# Patient Record
Sex: FEMALE | Race: WHITE | Employment: FULL TIME | URBAN - METROPOLITAN AREA
[De-identification: names, ages, dates, MRNs, and addresses within clinical notes are randomized per-mention and may not be internally consistent; named-entity substitution may affect disease eponyms.]

---

## 2017-01-19 RX ORDER — ROSUVASTATIN CALCIUM 5 MG/1
TABLET, COATED ORAL
Qty: 30 TABLET | Refills: 0 | Status: SHIPPED | OUTPATIENT
Start: 2017-01-19 | End: 2017-02-18 | Stop reason: SDUPTHER

## 2017-01-23 ENCOUNTER — OFFICE VISIT (OUTPATIENT)
Dept: FAMILY MEDICINE CLINIC | Age: 57
End: 2017-01-23

## 2017-01-23 VITALS
BODY MASS INDEX: 25.47 KG/M2 | WEIGHT: 157.8 LBS | SYSTOLIC BLOOD PRESSURE: 116 MMHG | DIASTOLIC BLOOD PRESSURE: 82 MMHG | TEMPERATURE: 97.6 F

## 2017-01-23 DIAGNOSIS — E78.2 MIXED HYPERLIPIDEMIA: ICD-10-CM

## 2017-01-23 DIAGNOSIS — F32.0 MILD SINGLE CURRENT EPISODE OF MAJOR DEPRESSIVE DISORDER (HCC): ICD-10-CM

## 2017-01-23 DIAGNOSIS — M79.10 MYALGIA: Primary | ICD-10-CM

## 2017-01-23 DIAGNOSIS — E03.9 ACQUIRED HYPOTHYROIDISM: ICD-10-CM

## 2017-01-23 PROCEDURE — 99214 OFFICE O/P EST MOD 30 MIN: CPT | Performed by: FAMILY MEDICINE

## 2017-01-23 ASSESSMENT — ENCOUNTER SYMPTOMS
EYES NEGATIVE: 1
GASTROINTESTINAL NEGATIVE: 1
BACK PAIN: 0
RESPIRATORY NEGATIVE: 1

## 2017-01-24 DIAGNOSIS — E78.2 MIXED HYPERLIPIDEMIA: ICD-10-CM

## 2017-01-24 DIAGNOSIS — E03.9 ACQUIRED HYPOTHYROIDISM: ICD-10-CM

## 2017-01-24 LAB
A/G RATIO: 2.1 (ref 1.1–2.2)
ALBUMIN SERPL-MCNC: 4.2 G/DL (ref 3.4–5)
ALP BLD-CCNC: 58 U/L (ref 40–129)
ALT SERPL-CCNC: 34 U/L (ref 10–40)
ANION GAP SERPL CALCULATED.3IONS-SCNC: 14 MMOL/L (ref 3–16)
AST SERPL-CCNC: 29 U/L (ref 15–37)
BILIRUB SERPL-MCNC: 0.4 MG/DL (ref 0–1)
BUN BLDV-MCNC: 15 MG/DL (ref 7–20)
CALCIUM SERPL-MCNC: 8.6 MG/DL (ref 8.3–10.6)
CHLORIDE BLD-SCNC: 103 MMOL/L (ref 99–110)
CHOLESTEROL, TOTAL: 163 MG/DL (ref 0–199)
CO2: 24 MMOL/L (ref 21–32)
CREAT SERPL-MCNC: 0.8 MG/DL (ref 0.6–1.1)
GFR AFRICAN AMERICAN: >60
GFR NON-AFRICAN AMERICAN: >60
GLOBULIN: 2 G/DL
GLUCOSE BLD-MCNC: 72 MG/DL (ref 70–99)
HDLC SERPL-MCNC: 79 MG/DL (ref 40–60)
LDL CHOLESTEROL CALCULATED: 72 MG/DL
POTASSIUM SERPL-SCNC: 4.6 MMOL/L (ref 3.5–5.1)
SODIUM BLD-SCNC: 141 MMOL/L (ref 136–145)
T4 FREE: 1.5 NG/DL (ref 0.9–1.8)
TOTAL PROTEIN: 6.2 G/DL (ref 6.4–8.2)
TRIGL SERPL-MCNC: 59 MG/DL (ref 0–150)
TSH SERPL DL<=0.05 MIU/L-ACNC: 5.65 UIU/ML (ref 0.27–4.2)
VLDLC SERPL CALC-MCNC: 12 MG/DL

## 2017-01-25 ENCOUNTER — PATIENT MESSAGE (OUTPATIENT)
Dept: FAMILY MEDICINE CLINIC | Age: 57
End: 2017-01-25

## 2017-01-25 DIAGNOSIS — E03.9 ACQUIRED HYPOTHYROIDISM: Primary | ICD-10-CM

## 2017-01-25 DIAGNOSIS — I10 ESSENTIAL HYPERTENSION: ICD-10-CM

## 2017-02-18 DIAGNOSIS — E78.2 MIXED HYPERLIPIDEMIA: Primary | ICD-10-CM

## 2017-02-18 RX ORDER — ROSUVASTATIN CALCIUM 5 MG/1
5 TABLET, COATED ORAL DAILY
Qty: 90 TABLET | Refills: 1 | Status: SHIPPED | OUTPATIENT
Start: 2017-02-18 | End: 2017-08-17 | Stop reason: SDUPTHER

## 2017-03-29 DIAGNOSIS — E03.9 ACQUIRED HYPOTHYROIDISM: ICD-10-CM

## 2017-03-29 RX ORDER — LEVOTHYROXINE SODIUM 0.15 MG/1
150 TABLET ORAL DAILY
Qty: 90 TABLET | Refills: 1 | Status: SHIPPED | OUTPATIENT
Start: 2017-03-29 | End: 2017-09-21 | Stop reason: SDUPTHER

## 2017-07-03 ENCOUNTER — HOSPITAL ENCOUNTER (OUTPATIENT)
Dept: MAMMOGRAPHY | Age: 57
Discharge: HOME | End: 2017-07-03
Attending: OBSTETRICS & GYNECOLOGY | Admitting: OBSTETRICS & GYNECOLOGY

## 2017-07-03 DIAGNOSIS — Z12.31 VISIT FOR SCREENING MAMMOGRAM: ICD-10-CM

## 2017-08-17 DIAGNOSIS — E78.2 MIXED HYPERLIPIDEMIA: ICD-10-CM

## 2017-08-17 RX ORDER — ROSUVASTATIN CALCIUM 5 MG/1
TABLET, COATED ORAL
Qty: 30 TABLET | Refills: 0 | Status: SHIPPED | OUTPATIENT
Start: 2017-08-17 | End: 2017-09-06 | Stop reason: SDUPTHER

## 2017-09-06 DIAGNOSIS — E78.2 MIXED HYPERLIPIDEMIA: ICD-10-CM

## 2017-09-06 RX ORDER — ROSUVASTATIN CALCIUM 5 MG/1
5 TABLET, COATED ORAL DAILY
Qty: 21 TABLET | Refills: 0 | Status: SHIPPED | OUTPATIENT
Start: 2017-09-06 | End: 2017-10-19 | Stop reason: SDUPTHER

## 2017-09-21 DIAGNOSIS — E03.9 ACQUIRED HYPOTHYROIDISM: ICD-10-CM

## 2017-09-21 RX ORDER — LEVOTHYROXINE SODIUM 150 MCG
TABLET ORAL
Qty: 30 TABLET | Refills: 0 | Status: SHIPPED | OUTPATIENT
Start: 2017-09-21 | End: 2017-10-18 | Stop reason: SDUPTHER

## 2017-10-18 DIAGNOSIS — E03.9 ACQUIRED HYPOTHYROIDISM: ICD-10-CM

## 2017-10-18 RX ORDER — LEVOTHYROXINE SODIUM 150 MCG
TABLET ORAL
Qty: 21 TABLET | Refills: 0 | Status: SHIPPED | OUTPATIENT
Start: 2017-10-18 | End: 2017-10-30 | Stop reason: SDUPTHER

## 2017-10-19 DIAGNOSIS — E78.2 MIXED HYPERLIPIDEMIA: ICD-10-CM

## 2017-10-19 RX ORDER — ROSUVASTATIN CALCIUM 5 MG/1
5 TABLET, COATED ORAL DAILY
Qty: 14 TABLET | Refills: 0 | Status: SHIPPED | OUTPATIENT
Start: 2017-10-19 | End: 2017-10-30 | Stop reason: SDUPTHER

## 2017-10-30 ENCOUNTER — OFFICE VISIT (OUTPATIENT)
Dept: FAMILY MEDICINE CLINIC | Age: 57
End: 2017-10-30

## 2017-10-30 VITALS
TEMPERATURE: 97.8 F | DIASTOLIC BLOOD PRESSURE: 78 MMHG | SYSTOLIC BLOOD PRESSURE: 126 MMHG | WEIGHT: 152.2 LBS | BODY MASS INDEX: 28.01 KG/M2 | HEIGHT: 62 IN

## 2017-10-30 DIAGNOSIS — I10 ESSENTIAL HYPERTENSION: ICD-10-CM

## 2017-10-30 DIAGNOSIS — R74.8 ELEVATED LIVER ENZYMES: ICD-10-CM

## 2017-10-30 DIAGNOSIS — E03.9 ACQUIRED HYPOTHYROIDISM: ICD-10-CM

## 2017-10-30 DIAGNOSIS — F43.9 STRESS AT HOME: ICD-10-CM

## 2017-10-30 DIAGNOSIS — E78.2 MIXED HYPERLIPIDEMIA: Primary | ICD-10-CM

## 2017-10-30 PROCEDURE — 99214 OFFICE O/P EST MOD 30 MIN: CPT | Performed by: FAMILY MEDICINE

## 2017-10-30 RX ORDER — ROSUVASTATIN CALCIUM 5 MG/1
5 TABLET, COATED ORAL DAILY
Qty: 90 TABLET | Refills: 1 | Status: SHIPPED | OUTPATIENT
Start: 2017-10-30 | End: 2018-04-22 | Stop reason: SDUPTHER

## 2017-10-30 RX ORDER — LEVOTHYROXINE SODIUM 0.15 MG/1
150 TABLET ORAL DAILY
Qty: 90 TABLET | Refills: 1 | Status: SHIPPED | OUTPATIENT
Start: 2017-10-30 | End: 2018-05-21 | Stop reason: SDUPTHER

## 2017-10-30 ASSESSMENT — ENCOUNTER SYMPTOMS
GASTROINTESTINAL NEGATIVE: 1
RESPIRATORY NEGATIVE: 1
EYES NEGATIVE: 1

## 2017-10-30 NOTE — PROGRESS NOTES
tablet Take 1 tablet by mouth nightly. 90 tablet 1    loratadine (CLARITIN) 10 MG tablet Take 10 mg by mouth daily.  buPROPion (WELLBUTRIN XL) 300 MG XL tablet Take 300 mg by mouth every morning.  fluoxetine (PROZAC) 40 MG capsule Take 2 capsules by mouth daily. 60 capsule 3    aspirin 81 MG EC tablet Take 81 mg by mouth daily.  Multiple Vitamin (MULTIVITAMINS PO) Take  by mouth daily.  calcium carbonate 600 MG TABS tablet Take 1 tablet by mouth 2 times daily. Past Medical History:   Diagnosis Date    Depression     Essential hypertension 2017    Hyperlipidemia     Hyperthyroidism      Past Surgical History:   Procedure Laterality Date     SECTION      x4    COLONOSCOPY      2010    HYSTERECTOMY      SERGIO AND BSO       Family History   Problem Relation Age of Onset    Diabetes Mother     High Cholesterol Mother     High Blood Pressure Mother     Arthritis Mother     Depression Mother     Heart Disease Mother     High Blood Pressure Father     Coronary Art Dis Father     Arthritis Father     Heart Disease Father     Coronary Art Dis Sister     Other Sister      mi x2    Heart Disease Sister     High Cholesterol Sister     Diabetes Maternal Grandmother     Coronary Art Dis Maternal Grandmother     Heart Disease Maternal Grandmother     Coronary Art Dis Paternal Grandfather     Heart Disease Paternal Grandfather      Social History     Social History    Marital status:      Spouse name: N/A    Number of children: N/A    Years of education: N/A     Occupational History    Not on file.      Social History Main Topics    Smoking status: Never Smoker    Smokeless tobacco: Never Used    Alcohol use Yes    Drug use: Unknown    Sexual activity: Yes     Other Topics Concern    Not on file     Social History Narrative    No narrative on file         Any recent diagnostic tests, hospital reports, office notes or consultation letters were

## 2017-11-15 ENCOUNTER — TELEPHONE (OUTPATIENT)
Dept: OTHER | Facility: CLINIC | Age: 57
End: 2017-11-15

## 2018-02-09 ENCOUNTER — TELEPHONE (OUTPATIENT)
Dept: FAMILY MEDICINE CLINIC | Age: 58
End: 2018-02-09

## 2018-02-09 NOTE — TELEPHONE ENCOUNTER
Pt called requesting to have her son, Nilda Larry, age 25, become a new patient  Although telling her multiple times we werent open to new pts, she was persistant  She said she and her  both come here and wouldn't take no for an answer  She said he is covered under her insurance  Patient' son has ADD and Depression and is on Prozac and Vyvanse and sees Dr Nola Keene for those medications  Insisted he be seen  Wants a call back soon so she can make an appt for him  Please call mom with a yes or no

## 2018-04-22 DIAGNOSIS — E78.2 MIXED HYPERLIPIDEMIA: ICD-10-CM

## 2018-04-22 RX ORDER — ROSUVASTATIN CALCIUM 5 MG/1
5 TABLET, COATED ORAL DAILY
Qty: 90 TABLET | Refills: 0 | Status: SHIPPED | OUTPATIENT
Start: 2018-04-22 | End: 2018-05-21 | Stop reason: SDUPTHER

## 2018-05-21 ENCOUNTER — OFFICE VISIT (OUTPATIENT)
Dept: FAMILY MEDICINE CLINIC | Age: 58
End: 2018-05-21

## 2018-05-21 VITALS
SYSTOLIC BLOOD PRESSURE: 112 MMHG | WEIGHT: 142 LBS | OXYGEN SATURATION: 98 % | BODY MASS INDEX: 26.13 KG/M2 | DIASTOLIC BLOOD PRESSURE: 84 MMHG | HEART RATE: 65 BPM | HEIGHT: 62 IN

## 2018-05-21 DIAGNOSIS — I10 ESSENTIAL HYPERTENSION: ICD-10-CM

## 2018-05-21 DIAGNOSIS — E03.9 ACQUIRED HYPOTHYROIDISM: ICD-10-CM

## 2018-05-21 DIAGNOSIS — E78.2 MIXED HYPERLIPIDEMIA: ICD-10-CM

## 2018-05-21 PROCEDURE — 99214 OFFICE O/P EST MOD 30 MIN: CPT | Performed by: FAMILY MEDICINE

## 2018-05-21 RX ORDER — LEVOTHYROXINE SODIUM 0.15 MG/1
150 TABLET ORAL DAILY
Qty: 90 TABLET | Refills: 1 | Status: SHIPPED | OUTPATIENT
Start: 2018-05-21 | End: 2019-01-28 | Stop reason: SDUPTHER

## 2018-05-21 RX ORDER — ROSUVASTATIN CALCIUM 5 MG/1
5 TABLET, COATED ORAL DAILY
Qty: 90 TABLET | Refills: 1 | Status: SHIPPED | OUTPATIENT
Start: 2018-05-21 | End: 2018-11-06 | Stop reason: SDUPTHER

## 2018-05-21 ASSESSMENT — ENCOUNTER SYMPTOMS
EYES NEGATIVE: 1
GASTROINTESTINAL NEGATIVE: 1
RESPIRATORY NEGATIVE: 1

## 2018-07-11 ENCOUNTER — HOSPITAL ENCOUNTER (OUTPATIENT)
Dept: MAMMOGRAPHY | Age: 58
Discharge: HOME | End: 2018-07-11
Attending: OBSTETRICS & GYNECOLOGY | Admitting: OBSTETRICS & GYNECOLOGY

## 2018-07-11 DIAGNOSIS — Z12.31 VISIT FOR SCREENING MAMMOGRAM: ICD-10-CM

## 2018-11-06 DIAGNOSIS — E78.2 MIXED HYPERLIPIDEMIA: ICD-10-CM

## 2018-11-06 RX ORDER — ROSUVASTATIN CALCIUM 5 MG/1
5 TABLET, COATED ORAL DAILY
Qty: 90 TABLET | Refills: 1 | Status: SHIPPED | OUTPATIENT
Start: 2018-11-06 | End: 2019-02-06 | Stop reason: SDUPTHER

## 2018-11-06 NOTE — TELEPHONE ENCOUNTER
Last visit date- 5/21/2018          Future appointment scheduled-02/11/2019       Last refill date-05/21/2018 90 tabs with 1 refill      Last labs-05/18/2018

## 2019-01-28 ENCOUNTER — TELEPHONE (OUTPATIENT)
Dept: FAMILY MEDICINE CLINIC | Age: 59
End: 2019-01-28

## 2019-01-28 DIAGNOSIS — E03.9 ACQUIRED HYPOTHYROIDISM: ICD-10-CM

## 2019-01-28 RX ORDER — LEVOTHYROXINE SODIUM 0.15 MG/1
150 TABLET ORAL DAILY
Qty: 90 TABLET | Refills: 1 | Status: SHIPPED | OUTPATIENT
Start: 2019-01-28 | End: 2019-01-29 | Stop reason: SDUPTHER

## 2019-01-29 DIAGNOSIS — E03.9 ACQUIRED HYPOTHYROIDISM: ICD-10-CM

## 2019-01-29 RX ORDER — LEVOTHYROXINE SODIUM 0.15 MG/1
150 TABLET ORAL DAILY
Qty: 90 TABLET | Refills: 1 | Status: SHIPPED | OUTPATIENT
Start: 2019-01-29 | End: 2019-02-06 | Stop reason: SDUPTHER

## 2019-02-06 ENCOUNTER — OFFICE VISIT (OUTPATIENT)
Dept: FAMILY MEDICINE CLINIC | Age: 59
End: 2019-02-06
Payer: COMMERCIAL

## 2019-02-06 VITALS
DIASTOLIC BLOOD PRESSURE: 76 MMHG | SYSTOLIC BLOOD PRESSURE: 120 MMHG | OXYGEN SATURATION: 97 % | HEART RATE: 63 BPM | WEIGHT: 151 LBS | BODY MASS INDEX: 27.79 KG/M2 | HEIGHT: 62 IN

## 2019-02-06 DIAGNOSIS — E03.9 ACQUIRED HYPOTHYROIDISM: ICD-10-CM

## 2019-02-06 DIAGNOSIS — Z12.11 ENCOUNTER FOR SCREENING COLONOSCOPY: Primary | ICD-10-CM

## 2019-02-06 DIAGNOSIS — E78.2 MIXED HYPERLIPIDEMIA: ICD-10-CM

## 2019-02-06 DIAGNOSIS — Z00.00 ANNUAL PHYSICAL EXAM: ICD-10-CM

## 2019-02-06 DIAGNOSIS — Z00.00 ANNUAL PHYSICAL EXAM: Primary | ICD-10-CM

## 2019-02-06 LAB
A/G RATIO: 1.6 (ref 1.1–2.2)
ALBUMIN SERPL-MCNC: 4.2 G/DL (ref 3.4–5)
ALP BLD-CCNC: 50 U/L (ref 40–129)
ALT SERPL-CCNC: 40 U/L (ref 10–40)
ANION GAP SERPL CALCULATED.3IONS-SCNC: 14 MMOL/L (ref 3–16)
AST SERPL-CCNC: 31 U/L (ref 15–37)
BILIRUB SERPL-MCNC: 0.4 MG/DL (ref 0–1)
BUN BLDV-MCNC: 19 MG/DL (ref 7–20)
CALCIUM SERPL-MCNC: 9.3 MG/DL (ref 8.3–10.6)
CHLORIDE BLD-SCNC: 106 MMOL/L (ref 99–110)
CHOLESTEROL, TOTAL: 199 MG/DL (ref 0–199)
CO2: 23 MMOL/L (ref 21–32)
CREAT SERPL-MCNC: 0.8 MG/DL (ref 0.6–1.2)
GFR AFRICAN AMERICAN: >60
GFR NON-AFRICAN AMERICAN: >60
GLOBULIN: 2.7 G/DL
GLUCOSE BLD-MCNC: 75 MG/DL (ref 70–99)
HCT VFR BLD CALC: 43.1 % (ref 36–48)
HDLC SERPL-MCNC: 86 MG/DL (ref 40–60)
HEMOGLOBIN: 14.6 G/DL (ref 12–16)
LDL CHOLESTEROL CALCULATED: 101 MG/DL
MCH RBC QN AUTO: 32.1 PG (ref 26–34)
MCHC RBC AUTO-ENTMCNC: 33.8 G/DL (ref 31–36)
MCV RBC AUTO: 95 FL (ref 80–100)
PDW BLD-RTO: 12.1 % (ref 12.4–15.4)
PLATELET # BLD: 259 K/UL (ref 135–450)
PMV BLD AUTO: 9 FL (ref 5–10.5)
POTASSIUM SERPL-SCNC: 4.8 MMOL/L (ref 3.5–5.1)
RBC # BLD: 4.54 M/UL (ref 4–5.2)
SODIUM BLD-SCNC: 143 MMOL/L (ref 136–145)
T4 FREE: 0.9 NG/DL (ref 0.9–1.8)
TOTAL PROTEIN: 6.9 G/DL (ref 6.4–8.2)
TRIGL SERPL-MCNC: 62 MG/DL (ref 0–150)
TSH SERPL DL<=0.05 MIU/L-ACNC: 9.61 UIU/ML (ref 0.27–4.2)
VITAMIN D 25-HYDROXY: 42 NG/ML
VLDLC SERPL CALC-MCNC: 12 MG/DL
WBC # BLD: 5.3 K/UL (ref 4–11)

## 2019-02-06 PROCEDURE — 99396 PREV VISIT EST AGE 40-64: CPT | Performed by: FAMILY MEDICINE

## 2019-02-06 RX ORDER — LEVOTHYROXINE SODIUM 0.15 MG/1
150 TABLET ORAL DAILY
Qty: 90 TABLET | Refills: 3 | Status: SHIPPED | OUTPATIENT
Start: 2019-02-06 | End: 2020-03-10

## 2019-02-06 RX ORDER — ROSUVASTATIN CALCIUM 5 MG/1
5 TABLET, COATED ORAL DAILY
Qty: 90 TABLET | Refills: 3 | Status: SHIPPED | OUTPATIENT
Start: 2019-02-06 | End: 2020-03-31

## 2019-02-06 ASSESSMENT — ENCOUNTER SYMPTOMS
EYE REDNESS: 0
SHORTNESS OF BREATH: 0
NAUSEA: 0
ABDOMINAL PAIN: 0
COLOR CHANGE: 0
EYE DISCHARGE: 0

## 2019-02-07 ENCOUNTER — TELEPHONE (OUTPATIENT)
Dept: FAMILY MEDICINE CLINIC | Age: 59
End: 2019-02-07

## 2019-02-07 DIAGNOSIS — E03.9 ACQUIRED HYPOTHYROIDISM: Primary | ICD-10-CM

## 2019-07-12 ENCOUNTER — TELEPHONE (OUTPATIENT)
Dept: FAMILY MEDICINE CLINIC | Age: 59
End: 2019-07-12

## 2019-07-12 ENCOUNTER — HOSPITAL ENCOUNTER (OUTPATIENT)
Dept: MAMMOGRAPHY | Age: 59
Discharge: HOME | End: 2019-07-12
Payer: COMMERCIAL

## 2019-07-12 ENCOUNTER — HOSPITAL ENCOUNTER (OUTPATIENT)
Dept: ULTRASOUND IMAGING | Age: 59
Discharge: HOME | End: 2019-07-12
Payer: COMMERCIAL

## 2019-07-12 ENCOUNTER — HOSPITAL ENCOUNTER (OUTPATIENT)
Dept: GENERAL RADIOLOGY | Age: 59
Discharge: HOME | End: 2019-07-12
Payer: COMMERCIAL

## 2019-07-12 DIAGNOSIS — N63.0 BREAST NODULE: ICD-10-CM

## 2019-07-12 DIAGNOSIS — N95.1 MENOPAUSAL SYNDROME: ICD-10-CM

## 2019-07-12 DIAGNOSIS — N63.0 BREAST NODULE: Primary | ICD-10-CM

## 2019-07-12 DIAGNOSIS — Z12.31 VISIT FOR SCREENING MAMMOGRAM: ICD-10-CM

## 2019-07-12 PROCEDURE — 76642 ULTRASOUND BREAST LIMITED: CPT

## 2019-07-12 PROCEDURE — 77067 SCR MAMMO BI INCL CAD: CPT

## 2019-07-12 PROCEDURE — 77080 DXA BONE DENSITY AXIAL: CPT

## 2019-07-12 PROCEDURE — 77065 DX MAMMO INCL CAD UNI: CPT

## 2019-08-02 ASSESSMENT — ENCOUNTER SYMPTOMS
CONSTIPATION: 0
NAUSEA: 0
EYE REDNESS: 0
VOMITING: 0
DIARRHEA: 0
SHORTNESS OF BREATH: 0

## 2019-08-02 NOTE — PROGRESS NOTES
Occupational History    Not on file   Social Needs    Financial resource strain: Not on file    Food insecurity:     Worry: Not on file     Inability: Not on file    Transportation needs:     Medical: Not on file     Non-medical: Not on file   Tobacco Use    Smoking status: Never Smoker    Smokeless tobacco: Never Used   Substance and Sexual Activity    Alcohol use: Yes    Drug use: Not on file    Sexual activity: Yes   Lifestyle    Physical activity:     Days per week: Not on file     Minutes per session: Not on file    Stress: Not on file   Relationships    Social connections:     Talks on phone: Not on file     Gets together: Not on file     Attends L[employer] day service: Not on file     Active member of club or organization: Not on file     Attends meetings of clubs or organizations: Not on file     Relationship status: Not on file    Intimate partner violence:     Fear of current or ex partner: Not on file     Emotionally abused: Not on file     Physically abused: Not on file     Forced sexual activity: Not on file   Other Topics Concern    Not on file   Social History Narrative    Not on file           Review of Systems   Constitutional: Negative for unexpected weight change. HENT: Negative. Eyes: Negative for redness. Respiratory: Negative for shortness of breath. Cardiovascular: Negative for chest pain and leg swelling. Gastrointestinal: Negative for constipation, diarrhea, nausea and vomiting. Endocrine: Negative for cold intolerance and heat intolerance. Skin: Negative for pallor. Allergic/Immunologic: Negative for immunocompromised state. Psychiatric/Behavioral: Negative for confusion. Controlled with medicaiton   All other systems reviewed and are negative.     Current Outpatient Medications   Medication Sig Dispense Refill    levothyroxine (SYNTHROID) 150 MCG tablet Take 1 tablet by mouth daily 90 tablet 3    rosuvastatin (CRESTOR) 5 MG tablet Take 1 tablet by mouth daily 90 tablet 3    vitamin D (CHOLECALCIFEROL) 1000 UNIT TABS tablet Take 1,000 Units by mouth daily      traZODone (DESYREL) 50 MG tablet Take 1 tablet by mouth nightly. 90 tablet 1    loratadine (CLARITIN) 10 MG tablet Take 10 mg by mouth daily.  buPROPion (WELLBUTRIN XL) 300 MG XL tablet Take 300 mg by mouth every morning.  fluoxetine (PROZAC) 40 MG capsule Take 2 capsules by mouth daily. 60 capsule 3    aspirin 81 MG EC tablet Take 81 mg by mouth daily. No current facility-administered medications for this visit. Vitals:    08/07/19 0739   BP: 120/76   Pulse: 74   SpO2: 97%         Physical Exam  Physical Exam   Constitutional: She is oriented to person, place, and time. She appears well-developed and well-nourished. No distress. HENT:   Head: Normocephalic and atraumatic. Eyes: Conjunctivae are normal. Right eye exhibits no discharge. Left eye exhibits no discharge. No scleral icterus. Neck: Normal range of motion. Neck supple. Cardiovascular: Normal rate, regular rhythm and normal heart sounds. Pulmonary/Chest: Effort normal and breath sounds normal. No stridor. No respiratory distress. Musculoskeletal: She exhibits no edema. Neurological: She is alert and oriented to person, place, and time. Skin: Skin is warm and dry. No rash noted. She is not diaphoretic. No erythema. No pallor. Psychiatric: She has a normal mood and affect. Her behavior is normal. Judgment and thought content normal.   Nursing note and vitals reviewed. Diagnosis Orders   1. Acquired hypothyroidism  T4, Free    TSH without Reflex   2. Depression  FLUoxetine (PROZAC) 40 MG capsule         Acquired hypothyroidism with elevated tsh and normal t4.  -     T4, Free  -     TSH without Reflex    Depression  -     FLUoxetine (PROZAC) 40 MG capsule; Take 2 capsules by mouth daily    Other orders  -     traZODone (DESYREL) 50 MG tablet;  Take 1.5 tablets by mouth nightly    Filled out physical

## 2019-08-07 ENCOUNTER — OFFICE VISIT (OUTPATIENT)
Dept: FAMILY MEDICINE CLINIC | Age: 59
End: 2019-08-07
Payer: COMMERCIAL

## 2019-08-07 VITALS
SYSTOLIC BLOOD PRESSURE: 120 MMHG | BODY MASS INDEX: 27.97 KG/M2 | WEIGHT: 152 LBS | HEART RATE: 74 BPM | OXYGEN SATURATION: 97 % | DIASTOLIC BLOOD PRESSURE: 76 MMHG | HEIGHT: 62 IN

## 2019-08-07 DIAGNOSIS — E03.9 ACQUIRED HYPOTHYROIDISM: ICD-10-CM

## 2019-08-07 DIAGNOSIS — E03.9 ACQUIRED HYPOTHYROIDISM: Primary | ICD-10-CM

## 2019-08-07 DIAGNOSIS — F32.A DEPRESSION, UNSPECIFIED DEPRESSION TYPE: ICD-10-CM

## 2019-08-07 LAB
T4 FREE: 1.6 NG/DL (ref 0.9–1.8)
TSH SERPL DL<=0.05 MIU/L-ACNC: 1.6 UIU/ML (ref 0.27–4.2)

## 2019-08-07 PROCEDURE — 99213 OFFICE O/P EST LOW 20 MIN: CPT | Performed by: FAMILY MEDICINE

## 2019-08-07 RX ORDER — FLUOXETINE HYDROCHLORIDE 40 MG/1
80 CAPSULE ORAL DAILY
Qty: 180 CAPSULE | Refills: 3 | Status: SHIPPED | OUTPATIENT
Start: 2019-08-07 | End: 2020-08-05

## 2019-08-07 RX ORDER — TRAZODONE HYDROCHLORIDE 50 MG/1
75 TABLET ORAL NIGHTLY
Qty: 135 TABLET | Refills: 3 | Status: SHIPPED | OUTPATIENT
Start: 2019-08-07 | End: 2020-08-28

## 2019-08-26 ENCOUNTER — OFFICE VISIT (OUTPATIENT)
Dept: FAMILY MEDICINE CLINIC | Age: 59
End: 2019-08-26
Payer: COMMERCIAL

## 2019-08-26 VITALS
WEIGHT: 155 LBS | HEART RATE: 67 BPM | TEMPERATURE: 98 F | BODY MASS INDEX: 28.52 KG/M2 | OXYGEN SATURATION: 96 % | HEIGHT: 62 IN | DIASTOLIC BLOOD PRESSURE: 74 MMHG | SYSTOLIC BLOOD PRESSURE: 110 MMHG

## 2019-08-26 DIAGNOSIS — B96.89 ACUTE BACTERIAL SINUSITIS: Primary | ICD-10-CM

## 2019-08-26 DIAGNOSIS — J01.90 ACUTE BACTERIAL SINUSITIS: Primary | ICD-10-CM

## 2019-08-26 PROCEDURE — 99213 OFFICE O/P EST LOW 20 MIN: CPT | Performed by: NURSE PRACTITIONER

## 2019-08-26 RX ORDER — AMOXICILLIN AND CLAVULANATE POTASSIUM 875; 125 MG/1; MG/1
1 TABLET, FILM COATED ORAL 2 TIMES DAILY
Qty: 20 TABLET | Refills: 0 | Status: SHIPPED | OUTPATIENT
Start: 2019-08-26 | End: 2019-09-05

## 2019-08-26 ASSESSMENT — ENCOUNTER SYMPTOMS
RHINORRHEA: 1
COUGH: 0
NAUSEA: 0
SINUS PAIN: 1
DIARRHEA: 0
SHORTNESS OF BREATH: 0
SINUS PRESSURE: 1
WHEEZING: 0
VOMITING: 0
STRIDOR: 0

## 2019-08-26 NOTE — PROGRESS NOTES
19    Chief Complaint   Patient presents with    Sinus Problem     headache, face pain, nasal discharge is yellow,      HPI: Jeffory Baumgarten is a 62 y.o. female who presents with complaints of sinus pressure, sinus pain, headache, nasal congestion. Pt states about 3 weeks ago was diagnosed with sinus infection and given RX for amoxicillin from her dentist. She was feeling better after this but symptoms never fully resolved. Symptoms worsening over the past several days. Using ibuprofen which has helped with headache. Denies fever, dyspnea, chest pain. Past Medical History:   Diagnosis Date    Depression     Essential hypertension 2017    Hyperlipidemia     Hyperthyroidism      Past Surgical History:   Procedure Laterality Date     SECTION      x4    COLONOSCOPY      2010    HYSTERECTOMY      SERGIO AND BSO       Social History     Tobacco Use    Smoking status: Never Smoker    Smokeless tobacco: Never Used   Substance Use Topics    Alcohol use: Yes    Drug use: Not on file       Family History   Problem Relation Age of Onset    Diabetes Mother     High Cholesterol Mother     High Blood Pressure Mother     Arthritis Mother     Depression Mother     Heart Disease Mother     High Blood Pressure Father     Coronary Art Dis Father     Arthritis Father     Heart Disease Father     Coronary Art Dis Sister     Other Sister         mi x2   [facility] Heart Disease Sister     High Cholesterol Sister     Diabetes Maternal Grandmother     Coronary Art Dis Maternal Grandmother     Heart Disease Maternal Grandmother     Coronary Art Dis Paternal Grandfather     Heart Disease Paternal Grandfather      Review of Systems   Constitutional: Negative for chills, diaphoresis, fatigue and fever. HENT: Positive for congestion, postnasal drip, rhinorrhea, sinus pressure and sinus pain. Respiratory: Negative for cough, shortness of breath, wheezing and stridor.     Cardiovascular: Negative for as prescribed  Follow up PRN    Patient Instructions   Augmentin twice daily for 10 days  Take daily probiotic  Follow up as needed       Outpatient Encounter Medications as of 8/26/2019   Medication Sig Dispense Refill    amoxicillin-clavulanate (AUGMENTIN) 875-125 MG per tablet Take 1 tablet by mouth 2 times daily for 10 days 20 tablet 0    FLUoxetine (PROZAC) 40 MG capsule Take 2 capsules by mouth daily 180 capsule 3    traZODone (DESYREL) 50 MG tablet Take 1.5 tablets by mouth nightly 135 tablet 3    levothyroxine (SYNTHROID) 150 MCG tablet Take 1 tablet by mouth daily 90 tablet 3    rosuvastatin (CRESTOR) 5 MG tablet Take 1 tablet by mouth daily 90 tablet 3    vitamin D (CHOLECALCIFEROL) 1000 UNIT TABS tablet Take 1,000 Units by mouth daily      loratadine (CLARITIN) 10 MG tablet Take 10 mg by mouth daily.  buPROPion (WELLBUTRIN XL) 300 MG XL tablet Take 300 mg by mouth every morning.  aspirin 81 MG EC tablet Take 81 mg by mouth daily. No facility-administered encounter medications on file as of 8/26/2019.         Vikki Langford, CNP

## 2019-11-13 ENCOUNTER — OFFICE VISIT (OUTPATIENT)
Dept: FAMILY MEDICINE CLINIC | Age: 59
End: 2019-11-13
Payer: COMMERCIAL

## 2019-11-13 VITALS
RESPIRATION RATE: 18 BRPM | WEIGHT: 155 LBS | TEMPERATURE: 98.6 F | OXYGEN SATURATION: 96 % | SYSTOLIC BLOOD PRESSURE: 126 MMHG | BODY MASS INDEX: 28.35 KG/M2 | HEART RATE: 73 BPM | DIASTOLIC BLOOD PRESSURE: 84 MMHG

## 2019-11-13 DIAGNOSIS — J02.9 SORE THROAT: Primary | ICD-10-CM

## 2019-11-13 PROCEDURE — 99213 OFFICE O/P EST LOW 20 MIN: CPT | Performed by: NURSE PRACTITIONER

## 2019-11-13 PROCEDURE — 87880 STREP A ASSAY W/OPTIC: CPT | Performed by: NURSE PRACTITIONER

## 2019-11-13 RX ORDER — AMOXICILLIN 500 MG/1
500 CAPSULE ORAL 3 TIMES DAILY
Qty: 21 CAPSULE | Refills: 0 | Status: SHIPPED | OUTPATIENT
Start: 2019-11-13 | End: 2019-11-20

## 2019-11-13 ASSESSMENT — ENCOUNTER SYMPTOMS
VOMITING: 0
COUGH: 1
CHEST TIGHTNESS: 0
RHINORRHEA: 0
SINUS PRESSURE: 0
VISUAL CHANGE: 0
NAUSEA: 0
SHORTNESS OF BREATH: 0
EYES NEGATIVE: 1
TROUBLE SWALLOWING: 1
SORE THROAT: 1
CHANGE IN BOWEL HABIT: 0
SWOLLEN GLANDS: 1
VOICE CHANGE: 1
ABDOMINAL PAIN: 0
WHEEZING: 0

## 2019-11-14 LAB — S PYO AG THROAT QL: NORMAL

## 2019-11-15 RX ORDER — METHYLPREDNISOLONE 4 MG/1
TABLET ORAL
Qty: 1 KIT | Refills: 0 | Status: SHIPPED | OUTPATIENT
Start: 2019-11-15 | End: 2019-12-11 | Stop reason: CLARIF

## 2019-12-02 ENCOUNTER — TELEPHONE (OUTPATIENT)
Dept: FAMILY MEDICINE CLINIC | Age: 59
End: 2019-12-02

## 2019-12-11 ENCOUNTER — OFFICE VISIT (OUTPATIENT)
Dept: FAMILY MEDICINE CLINIC | Age: 59
End: 2019-12-11
Payer: COMMERCIAL

## 2019-12-11 VITALS
DIASTOLIC BLOOD PRESSURE: 84 MMHG | OXYGEN SATURATION: 95 % | BODY MASS INDEX: 28.89 KG/M2 | TEMPERATURE: 98 F | HEIGHT: 62 IN | SYSTOLIC BLOOD PRESSURE: 124 MMHG | HEART RATE: 69 BPM | WEIGHT: 157 LBS

## 2019-12-11 DIAGNOSIS — J06.9 VIRAL URI: ICD-10-CM

## 2019-12-11 DIAGNOSIS — B37.0 THRUSH, ORAL: Primary | ICD-10-CM

## 2019-12-11 PROCEDURE — 99213 OFFICE O/P EST LOW 20 MIN: CPT | Performed by: NURSE PRACTITIONER

## 2019-12-11 ASSESSMENT — ENCOUNTER SYMPTOMS
RHINORRHEA: 1
NAUSEA: 0
SINUS PRESSURE: 1
DIARRHEA: 0
COUGH: 1
VOMITING: 0
SORE THROAT: 1
SHORTNESS OF BREATH: 0
TROUBLE SWALLOWING: 0

## 2020-02-06 ENCOUNTER — APPOINTMENT (RX ONLY)
Dept: URBAN - METROPOLITAN AREA CLINIC 170 | Facility: CLINIC | Age: 60
Setting detail: DERMATOLOGY
End: 2020-02-06

## 2020-02-06 DIAGNOSIS — L81.4 OTHER MELANIN HYPERPIGMENTATION: ICD-10-CM

## 2020-02-06 DIAGNOSIS — Z85.828 PERSONAL HISTORY OF OTHER MALIGNANT NEOPLASM OF SKIN: ICD-10-CM

## 2020-02-06 DIAGNOSIS — D18.0 HEMANGIOMA: ICD-10-CM

## 2020-02-06 DIAGNOSIS — L82.1 OTHER SEBORRHEIC KERATOSIS: ICD-10-CM

## 2020-02-06 DIAGNOSIS — L82.0 INFLAMED SEBORRHEIC KERATOSIS: ICD-10-CM

## 2020-02-06 DIAGNOSIS — D22 MELANOCYTIC NEVI: ICD-10-CM

## 2020-02-06 DIAGNOSIS — I78.8 OTHER DISEASES OF CAPILLARIES: ICD-10-CM

## 2020-02-06 PROBLEM — D22.5 MELANOCYTIC NEVI OF TRUNK: Status: ACTIVE | Noted: 2020-02-06

## 2020-02-06 PROBLEM — D18.01 HEMANGIOMA OF SKIN AND SUBCUTANEOUS TISSUE: Status: ACTIVE | Noted: 2020-02-06

## 2020-02-06 PROCEDURE — ? COUNSELING

## 2020-02-06 PROCEDURE — ? MONITORING

## 2020-02-06 PROCEDURE — ? SUNSCREEN RECOMMENDATIONS

## 2020-02-06 PROCEDURE — ? FULL BODY SKIN EXAM

## 2020-02-06 PROCEDURE — ? PHOTO-DOCUMENTATION

## 2020-02-06 PROCEDURE — 99213 OFFICE O/P EST LOW 20 MIN: CPT

## 2020-02-06 ASSESSMENT — LOCATION DETAILED DESCRIPTION DERM
LOCATION DETAILED: NASAL TIP
LOCATION DETAILED: RIGHT LATERAL ABDOMEN
LOCATION DETAILED: INFERIOR THORACIC SPINE
LOCATION DETAILED: RIGHT MID-UPPER BACK
LOCATION DETAILED: EPIGASTRIC SKIN

## 2020-02-06 ASSESSMENT — LOCATION SIMPLE DESCRIPTION DERM
LOCATION SIMPLE: NOSE
LOCATION SIMPLE: RIGHT UPPER BACK
LOCATION SIMPLE: UPPER BACK
LOCATION SIMPLE: ABDOMEN

## 2020-02-06 ASSESSMENT — LOCATION ZONE DERM
LOCATION ZONE: TRUNK
LOCATION ZONE: NOSE

## 2020-02-06 NOTE — HPI: EVALUATION OF SKIN LESION(S)
What Type Of Note Output Would You Prefer (Optional)?: Bullet Format
Hpi Title: Evaluation of Skin Lesions
How Severe Are Your Spot(S)?: mild
Have Your Spot(S) Been Treated In The Past?: has not been treated
Additional History: Check nose scaly red spots

## 2020-02-06 NOTE — PROCEDURE: PHOTO-DOCUMENTATION
Details (Free Text): Right shoulder
Detail Level: Detailed
Photo Preface (Leave Blank If You Do Not Want): Photographs were obtained today

## 2020-03-10 RX ORDER — LEVOTHYROXINE SODIUM 0.15 MG/1
TABLET ORAL
Qty: 90 TABLET | Refills: 3 | Status: SHIPPED | OUTPATIENT
Start: 2020-03-10 | End: 2021-03-03

## 2020-03-31 RX ORDER — ROSUVASTATIN CALCIUM 5 MG/1
TABLET, COATED ORAL
Qty: 90 TABLET | Refills: 3 | Status: SHIPPED | OUTPATIENT
Start: 2020-03-31 | End: 2021-03-26

## 2020-04-24 ENCOUNTER — TELEMEDICINE (OUTPATIENT)
Dept: FAMILY MEDICINE CLINIC | Age: 60
End: 2020-04-24
Payer: COMMERCIAL

## 2020-04-24 ENCOUNTER — TELEPHONE (OUTPATIENT)
Dept: FAMILY MEDICINE CLINIC | Age: 60
End: 2020-04-24

## 2020-04-24 PROCEDURE — 99213 OFFICE O/P EST LOW 20 MIN: CPT | Performed by: FAMILY MEDICINE

## 2020-04-24 RX ORDER — VALACYCLOVIR HYDROCHLORIDE 1 G/1
2000 TABLET, FILM COATED ORAL 2 TIMES DAILY
Qty: 4 TABLET | Refills: 0 | Status: SHIPPED | OUTPATIENT
Start: 2020-04-24 | End: 2020-04-25

## 2020-04-24 NOTE — TELEPHONE ENCOUNTER
Patient scheduled for a video visit with Dr. Patel Session. Future Appointments   Date Time Provider Lukasz Olmstead   4/24/2020 12:20 PM DO LILLIE Mak     Vv directions sent to her.

## 2020-04-29 ENCOUNTER — TELEPHONE (OUTPATIENT)
Dept: FAMILY MEDICINE CLINIC | Age: 60
End: 2020-04-29

## 2020-04-29 RX ORDER — DOXYCYCLINE HYCLATE 100 MG
100 TABLET ORAL 2 TIMES DAILY
Qty: 20 TABLET | Refills: 1 | Status: SHIPPED | OUTPATIENT
Start: 2020-04-29 | End: 2020-10-02 | Stop reason: ALTCHOICE

## 2020-05-15 ENCOUNTER — APPOINTMENT (RX ONLY)
Dept: URBAN - METROPOLITAN AREA CLINIC 170 | Facility: CLINIC | Age: 60
Setting detail: DERMATOLOGY
End: 2020-05-15

## 2020-05-15 DIAGNOSIS — L71.0 PERIORAL DERMATITIS: ICD-10-CM

## 2020-05-15 PROCEDURE — ? EDUCATIONAL RESOURCES PROVIDED

## 2020-05-15 PROCEDURE — ? COUNSELING

## 2020-05-15 PROCEDURE — 99213 OFFICE O/P EST LOW 20 MIN: CPT

## 2020-05-15 PROCEDURE — ? PRESCRIPTION

## 2020-05-15 RX ORDER — CLINDAMYCIN PHOSPHATE 10 MG/ML
LOTION TOPICAL
Qty: 1 | Refills: 11 | Status: ERX

## 2020-05-15 ASSESSMENT — LOCATION SIMPLE DESCRIPTION DERM: LOCATION SIMPLE: RIGHT CHEEK

## 2020-05-15 ASSESSMENT — LOCATION ZONE DERM: LOCATION ZONE: FACE

## 2020-05-15 ASSESSMENT — LOCATION DETAILED DESCRIPTION DERM: LOCATION DETAILED: RIGHT INFERIOR MEDIAL MALAR CHEEK

## 2020-05-15 NOTE — HPI: RASH
What Type Of Note Output Would You Prefer (Optional)?: Bullet Format
How Severe Is Your Rash?: mild
Is This A New Presentation, Or A Follow-Up?: Rash
Additional History: No family history of psoriasis

## 2020-06-04 RX ORDER — BUPROPION HYDROCHLORIDE 300 MG/1
300 TABLET ORAL EVERY MORNING
Qty: 90 TABLET | Refills: 1 | Status: SHIPPED | OUTPATIENT
Start: 2020-06-04 | End: 2020-12-01

## 2020-07-20 ENCOUNTER — HOSPITAL ENCOUNTER (OUTPATIENT)
Dept: MAMMOGRAPHY | Age: 60
Discharge: HOME | End: 2020-07-20
Payer: COMMERCIAL

## 2020-07-20 PROCEDURE — 77063 BREAST TOMOSYNTHESIS BI: CPT

## 2020-08-05 RX ORDER — FLUOXETINE HYDROCHLORIDE 40 MG/1
CAPSULE ORAL
Qty: 180 CAPSULE | Refills: 3 | Status: SHIPPED | OUTPATIENT
Start: 2020-08-05 | End: 2021-07-07 | Stop reason: SDUPTHER

## 2020-08-28 RX ORDER — TRAZODONE HYDROCHLORIDE 50 MG/1
TABLET ORAL
Qty: 135 TABLET | Refills: 3 | Status: SHIPPED | OUTPATIENT
Start: 2020-08-28 | End: 2021-07-07 | Stop reason: SDUPTHER

## 2020-09-15 ENCOUNTER — TELEPHONE (OUTPATIENT)
Dept: FAMILY MEDICINE CLINIC | Age: 60
End: 2020-09-15

## 2020-09-15 NOTE — TELEPHONE ENCOUNTER
----- Message from Yadi Mabry sent at 9/15/2020  9:08 AM EDT -----  Subject: Message to Provider    QUESTIONS  Information for Provider? patient called because she usually gets blood   work since she's on synthroid and hasn't had blood work recently for that   she wanted to check in and see if Dr. Heraclio Tyson would like blood work done   and/or if she would like her to schedule an appointment   ---------------------------------------------------------------------------  --------------  [address]  What is the best way for the office to contact you? OK to leave message on   voicemail  Preferred Call Back Phone Number? [phone]  ---------------------------------------------------------------------------  --------------  SCRIPT ANSWERS  Relationship to Patient?  Self

## 2020-10-01 ASSESSMENT — ENCOUNTER SYMPTOMS
NAUSEA: 0
VOMITING: 0
SHORTNESS OF BREATH: 0
DIARRHEA: 0
CONSTIPATION: 0
EYE REDNESS: 0

## 2020-10-02 ENCOUNTER — OFFICE VISIT (OUTPATIENT)
Dept: FAMILY MEDICINE CLINIC | Age: 60
End: 2020-10-02
Payer: COMMERCIAL

## 2020-10-02 VITALS
HEART RATE: 58 BPM | DIASTOLIC BLOOD PRESSURE: 86 MMHG | TEMPERATURE: 98.2 F | SYSTOLIC BLOOD PRESSURE: 116 MMHG | OXYGEN SATURATION: 96 % | WEIGHT: 155 LBS | BODY MASS INDEX: 28.52 KG/M2 | HEIGHT: 62 IN

## 2020-10-02 LAB
A/G RATIO: 2 (ref 1.1–2.2)
ALBUMIN SERPL-MCNC: 4.3 G/DL (ref 3.4–5)
ALP BLD-CCNC: 54 U/L (ref 40–129)
ALT SERPL-CCNC: 20 U/L (ref 10–40)
ANION GAP SERPL CALCULATED.3IONS-SCNC: 10 MMOL/L (ref 3–16)
AST SERPL-CCNC: 20 U/L (ref 15–37)
BILIRUB SERPL-MCNC: 0.3 MG/DL (ref 0–1)
BUN BLDV-MCNC: 15 MG/DL (ref 7–20)
CALCIUM SERPL-MCNC: 9.4 MG/DL (ref 8.3–10.6)
CHLORIDE BLD-SCNC: 105 MMOL/L (ref 99–110)
CHOLESTEROL, TOTAL: 166 MG/DL (ref 0–199)
CO2: 23 MMOL/L (ref 21–32)
CREAT SERPL-MCNC: 0.7 MG/DL (ref 0.6–1.2)
GFR AFRICAN AMERICAN: >60
GFR NON-AFRICAN AMERICAN: >60
GLOBULIN: 2.1 G/DL
GLUCOSE BLD-MCNC: 86 MG/DL (ref 70–99)
HCT VFR BLD CALC: 43.6 % (ref 36–48)
HDLC SERPL-MCNC: 70 MG/DL (ref 40–60)
HEMOGLOBIN: 14.4 G/DL (ref 12–16)
LDL CHOLESTEROL CALCULATED: 82 MG/DL
MCH RBC QN AUTO: 30.9 PG (ref 26–34)
MCHC RBC AUTO-ENTMCNC: 33 G/DL (ref 31–36)
MCV RBC AUTO: 93.5 FL (ref 80–100)
PDW BLD-RTO: 12.7 % (ref 12.4–15.4)
PLATELET # BLD: 275 K/UL (ref 135–450)
PMV BLD AUTO: 9.3 FL (ref 5–10.5)
POTASSIUM SERPL-SCNC: 4.6 MMOL/L (ref 3.5–5.1)
RBC # BLD: 4.66 M/UL (ref 4–5.2)
SODIUM BLD-SCNC: 138 MMOL/L (ref 136–145)
T4 FREE: 1.8 NG/DL (ref 0.9–1.8)
TOTAL PROTEIN: 6.4 G/DL (ref 6.4–8.2)
TRIGL SERPL-MCNC: 69 MG/DL (ref 0–150)
TSH REFLEX FT4: 0.05 UIU/ML (ref 0.27–4.2)
VLDLC SERPL CALC-MCNC: 14 MG/DL
WBC # BLD: 7 K/UL (ref 4–11)

## 2020-10-02 PROCEDURE — 36415 COLL VENOUS BLD VENIPUNCTURE: CPT | Performed by: FAMILY MEDICINE

## 2020-10-02 PROCEDURE — 99396 PREV VISIT EST AGE 40-64: CPT | Performed by: FAMILY MEDICINE

## 2020-10-02 NOTE — PROGRESS NOTES
10/2/2020    Amadou Wilder (:  1960) is a 56 y.o. female, here for a preventive medicine evaluation. Chief Complaint   Patient presents with    Annual Exam     physical        She is here for a recheck on her thyroid she usually gets it checked about once a year due to acquired hypothyroidism. She takes her medication as prescribed and tolerates it well. She is also due for physical.    Well overall but has been seeing a psychiatrist due to stress. She has adult children living at home and is putting a stress on her including the stress of the pandemic. Patient Active Problem List   Diagnosis    Lateral meniscal tear    Menopausal syndrome    Elevated liver enzymes    Mild single current episode of major depressive disorder (HCC)    Mixed hyperlipidemia    Acquired hypothyroidism    Essential hypertension       Review of Systems   Constitutional: Negative for unexpected weight change. HENT: Negative. Eyes: Negative for redness. Respiratory: Negative for shortness of breath. Cardiovascular: Negative for chest pain and leg swelling. Gastrointestinal: Negative for constipation, diarrhea, nausea and vomiting. Skin: Negative for pallor. Allergic/Immunologic: Negative for immunocompromised state. Psychiatric/Behavioral: Negative for confusion. All other systems reviewed and are negative. Prior to Visit Medications    Medication Sig Taking?  Authorizing Provider   traZODone (DESYREL) 50 MG tablet TAKE ONE AND ONE-HALF TABLETS NIGHTLY Yes Xiomara Fisher DO   FLUoxetine (PROZAC) 40 MG capsule TAKE 2 CAPSULES DAILY Yes Xiomara Fisher DO   buPROPion (WELLBUTRIN XL) 300 MG extended release tablet Take 1 tablet by mouth every morning Yes Xiomara Fisher DO   rosuvastatin (CRESTOR) 5 MG tablet TAKE 1 TABLET DAILY Yes Xiomara Fisher DO   levothyroxine (SYNTHROID) 150 MCG tablet TAKE 1 TABLET DAILY Yes Xiomara Fisher DO   vitamin D (CHOLECALCIFEROL) 1000 UNIT TABS tablet Take 1,000 Units by High Blood Pressure Mother     Arthritis Mother     Depression Mother     Heart Disease Mother     High Blood Pressure Father     Coronary Art Dis Father     Arthritis Father     Heart Disease Father     Coronary Art Dis Sister     Other Sister         mi x2   [facility] Heart Disease Sister     High Cholesterol Sister     Diabetes Maternal Grandmother     Coronary Art Dis Maternal Grandmother     Heart Disease Maternal Grandmother     Coronary Art Dis Paternal Grandfather     Heart Disease Paternal Grandfather            Vitals:    10/02/20 0743   BP: 116/86   Site: Left Upper Arm   Position: Sitting   Cuff Size: Medium Adult   Pulse: 58   Temp: 98.2 °F (36.8 °C)   SpO2: 96%   Weight: 155 lb (70.3 kg)   Height: 5' 2\" (1.575 m)     Estimated body mass index is 28.35 kg/m² as calculated from the following:    Height as of this encounter: 5' 2\" (1.575 m). Weight as of this encounter: 155 lb (70.3 kg). Physical Exam  Vitals signs and nursing note reviewed. Constitutional:       General: She is not in acute distress. Appearance: Normal appearance. She is well-developed. She is not ill-appearing, toxic-appearing or diaphoretic. HENT:      Head: Normocephalic and atraumatic. Eyes:      General: No scleral icterus. Right eye: No discharge. Left eye: No discharge. Conjunctiva/sclera: Conjunctivae normal.   Neck:      Musculoskeletal: Neck supple. No neck rigidity. Cardiovascular:      Rate and Rhythm: Normal rate and regular rhythm. Heart sounds: Normal heart sounds. Pulmonary:      Effort: Pulmonary effort is normal. No respiratory distress. Breath sounds: Normal breath sounds. No stridor. Musculoskeletal:      Right lower leg: No edema. Left lower leg: No edema. Skin:     General: Skin is warm and dry. Coloration: Skin is not pale. Findings: No erythema or rash.    Neurological:      Mental Status: She is alert and oriented to person, place, and time.   Psychiatric:         Mood and Affect: Mood normal.         Behavior: Behavior normal.         Thought Content: Thought content normal.         Judgment: Judgment normal.                 The 10-year ASCVD risk score (Adelso Goel, et al., 2013) is: 2.6%    Values used to calculate the score:      Age: 56 years      Sex: Female      Is Non- : No      Diabetic: No      Tobacco smoker: No      Systolic Blood Pressure: 707 mmHg      Is BP treated: No      HDL Cholesterol: 86 mg/dL      Total Cholesterol: 199 mg/dL    Immunization History   Administered Date(s) Administered    Tdap (Boostrix, Adacel) 07/21/2014       Health Maintenance   Topic Date Due    Shingles Vaccine (1 of 2) 08/06/2008    Colon cancer screen colonoscopy  11/02/2019    Lipid screen  02/06/2020    Potassium monitoring  02/06/2020    Creatinine monitoring  02/06/2020    TSH testing  08/07/2020    Flu vaccine (1) 09/01/2020    Breast cancer screen  07/20/2022    DTaP/Tdap/Td vaccine (2 - Td) 07/21/2024    Hepatitis C screen  Completed    HIV screen  Completed    Hepatitis A vaccine  Aged Out    Hepatitis B vaccine  Aged Out    Hib vaccine  Aged Out    Meningococcal (ACWY) vaccine  Aged Out    Pneumococcal 0-64 years Vaccine  Aged Out       ASSESSMENT/PLAN:     Diagnosis Orders   1. Acquired hypothyroidism  TSH WITH REFLEX TO FT4   2. Annual physical exam  Comprehensive Metabolic Panel    Lipid Panel    TSH WITH REFLEX TO FT4    CBC     Blake Black was seen today for annual exam.    Diagnoses and all orders for this visit:    Acquired hypothyroidism  -     TSH WITH REFLEX TO FT4; Future    Annual physical exam  -     Comprehensive Metabolic Panel; Future  -     Lipid Panel; Future  -     TSH WITH REFLEX TO FT4; Future  -     CBC; Future            An electronic signature was used to authenticate this note. This note is dictated, errors are possible.   --Jovita Morrow DO on 10/2/2020 at 7:45 AM

## 2020-10-02 NOTE — PATIENT INSTRUCTIONS
Your fasting blood sugar should be below 100. If it is between 100-125 that is considered high and known as prediabetes, or  impaired glucose tolerance. If your blood sugar is too high, go to diabetes. org for a diabetes prevention diet. The A1c shows your average blood sugar over the previous 3 months. It is sometimes ordered if your blood sugar is elevated  You do not have to be fasting to get it drawn. If it is 5.6 or below it means your blood sugar is in the normal range  If between 5.7 and 6.5 it is impaired glucose tolerance. If it is above 6.5 it is consistent with the diagnosis of  diabetes. Decrease high carb foods if your blood sugar is high. High carbohydrate foods are:  Breads, muffins, rolls, and bagels  Pasta, rice, corn, and grains  Potatoes, sweet potatoes  Legumes: peas beans lentils. Milk ( almond milk ok)  Most fruit except berries  Cake cookies pies ice cream candy etc.  Juices, soda, sweetened ice tea  Beer. Cholesterol, the ideal numbers explained: Total cholesterol should be below 200  The triglycerides should be below 150  The HDL, the good cholesterol should be above 40  The LDL, the bad cholesterol should be below 100. Although it can be as high as 130 if no risk factors for heart disease or no diabetes. Improve your cholesterol profile:     Cardiovascular exercise helps. Start with 15 minutes three times a week and the work up to at least 30 minutes 5 days a week. Exercise at a level that you can carry on a conversation during. Loose extra pounds. Pay attention to your serving sides and avoid eating second helpings at meals. Significantly decrease the amount of processed food, junk food, and fast food that you eat. Decrease animal sources of saturated fats, and completely avoid and eliminate  hydrogenated oils and trans fats. Check the Food Label on the food you eat and avoid foods that have hydrogenated vegetable oils in them.  That includes bakery products. Drink skim milk which contains no fat. Increase the amount of fresh food that you cook yourself. Eat at least five servings of fruits and vegetables a day. Increase the portion of your plate that contains the fruit and vegetables to at least 50%,-70%,  and decrease the amount of starches like potatoes, rice, pasta to no more than 25% of your plate. Increase your fiber intake. Fiber is found in fruits and vegetables as well as whole grains, oatmeal,  and beans. A diet with at least 5 servings of fruits and vegetables should give you about 10-20grams of fiber daily. Switch to monounsaturated fats like olive oil. Fat from olives, avocados, coconut, or other nuts is okay. Add baked or grilled fish to you diet at least 1-2 times a week. Learn more about cholesterol on the Internet. Check out these resources:    American Heart Association   Heart. [address]:   [employer]    Triglycerides can be lowered without medication by exercising, and loosing weight and eating a diet lower in carbohydrates especially from flour sources,  and avoiding simple sugars. The good cholesterol is HDL. In order to increase the good cholesterol, increasing cardiovascular exercise helps. Avoid hydrogenated oils (trans fats) in processed, bakery,  and junk food. Use monounsaturated fats such as olive oil can help raise the good cholesterol. For your weight, exercise 30 minutes 5 times weekly and improve the types of food you eat:    Protein   Best options: The American Diabetes Association (ADA) recommends lean proteins low in saturated fat, like fish or turkey. Aim for two servings of seafood each week; some fish, like salmon, have the added benefit of containing heart healthy omega-3 fats. For a vegetarian protein source, experiment with the wide variety of beans. Nuts, which are protein and healthy fats powerhouses, are also a choice.    Worst options: Processed deli meats and hot dogs have high amounts of fat along with lots of sodium, which can increase the risk of high blood pressure. Heart attack and stroke are two common complications of diabetes, so keeping blood pressure in check is important. Grains   Best options: When choosing grains, make sure theyre whole. Decrease the amount of foods which contain flour. Whole grains such as wild rice, quinoa, and whole grain breads and cereals contain fiber, which is beneficial for digestive health, but often the grains flour products raise your blood sugar and when your blood sugar returns to normal, it can trigger the desire to eat again.   Worst options: Refined white flour doesnt contain the same health benefits as whole grains. Processed foods made with white flour include breakfast cereals, white bread, and pastries, cookies, muffins, pretzels, crackers, so avoid these options. Also try to steer clear of white rice and pasta. Dairy   Best options: With only 6 to 8 grams of carbohydrates in a serving, plain nonfat [place] yogurt is a healthy and versatile dairy option. You can add berries and enjoy it for dessert or breakfast; you can use it in recipes as a replacement for sour cream, which is high in saturated fat. Skim milk.  Worst options: Avoid all full-fat dairy products and especially packaged chocolate milk, as it also has added sugar. Avoid yogurts with added sugar. Vegetables   Best options: Non-starchy vegetables such as leafy greens, broccoli, cauliflower, asparagus, and carrots are low in carbohydrates and high in fiber and other nutrients, Shayy Alvarez says. You can eat non-starchy vegetables in abundance -- half of your plate should be filled with these veggies. If youre craving mashed potatoes, give mashed cauliflower a try.  Worst options: Stick to small portions of starchy vegetables such as corn, potatoes, and peas. These items are nutritious, but should be eaten in moderation.  The ADA groups them with grains because of their high carb content. Fruit   Best options: Fresh fruit can conquer your craving for sweets while providing antioxidants and fiber. Berries are a great option because recommended portion sizes are typically generous, which may leave you feeling more satisfied   Worst options: Avoid added sugar by eliminating fruits canned in syrup, and be aware that dried fruits have a very high sugar concentration. Also, fruit juices should be consumed rarely as theyre high in sugar and dont contain the same nutrients as whole fruit. Fats   Best options: Some types of fat actually help protect your heart. Choose the monounsaturated fats found in avocados, almonds, olives, and pecans or the polyunsaturated fats found in walnuts and sunflower oil, which can help to lower bad cholesterol.  Worst options: Saturated fats increase bad cholesterol, so limit butter, cheese, gravy, and fried foods. Keep calories from animal sources of saturated fat to less than 10 percent of your total daily intake. Trans fats are even worse than saturated fats, so avoid them completely. Look for the term hydrogenated on labels of processed foods such as packaged snacks, baked goods, and crackers.  For more help losing weight:         Keep track of what you eat everyday :      Lose it! adriana for the smart phone, or loseit. com or [employer] are free online tools you can use to track your daily intake.     The online tools will help you establish your goals for weight loss and how much you should eat a day to accomplish the goal.       A BMI (body mass index) of 25 to 29 is considered overweight, a BMI of 30 and above is considered obese. BMI is found with your vitals on your After Visit Summary. In addition to the above diet info,    Iincrease the amount of fruit and vegetables you eat each day. Ideally you are eating at least 5 servings of fruit and or vegetables a day.        Drink at least 50 ounces of water a day.      Avoid or decrease processed food, fast food, and junk food.     Begin to exercise 15 minutes three times a week doing cardiovascular exercise.     Don't quit. It can take 12 weeks to break old habits before you feel like you are in a new routine. Then you have to live your new lifestyle. Adrian Peres Once you are adjusted to your new habits you will not have to keep recording your daily intake, you should have a good idea of what is a normal amount for you to eat each day.     If you are obese, have elevated blood sugar, heart disease or a medical condition that is worsened by excess weight, I can refer you to a [employer] for education, usually covered by insurance.   

## 2020-12-01 RX ORDER — BUPROPION HYDROCHLORIDE 300 MG/1
TABLET ORAL
Qty: 90 TABLET | Refills: 3 | Status: SHIPPED | OUTPATIENT
Start: 2020-12-01 | End: 2021-07-07 | Stop reason: SDUPTHER

## 2021-03-03 DIAGNOSIS — E03.9 ACQUIRED HYPOTHYROIDISM: ICD-10-CM

## 2021-03-03 RX ORDER — LEVOTHYROXINE SODIUM 0.15 MG/1
TABLET ORAL
Qty: 90 TABLET | Refills: 3 | Status: SHIPPED | OUTPATIENT
Start: 2021-03-03 | End: 2021-07-07 | Stop reason: SDUPTHER

## 2021-03-10 ENCOUNTER — IMMUNIZATION (OUTPATIENT)
Dept: PRIMARY CARE CLINIC | Age: 61
End: 2021-03-10
Payer: COMMERCIAL

## 2021-03-10 PROCEDURE — 0001A COVID-19, PFIZER VACCINE 30MCG/0.3ML DOSE: CPT | Performed by: FAMILY MEDICINE

## 2021-03-10 PROCEDURE — 91300 COVID-19, PFIZER VACCINE 30MCG/0.3ML DOSE: CPT | Performed by: FAMILY MEDICINE

## 2021-03-26 DIAGNOSIS — E78.2 MIXED HYPERLIPIDEMIA: ICD-10-CM

## 2021-03-26 RX ORDER — ROSUVASTATIN CALCIUM 5 MG/1
TABLET, COATED ORAL
Qty: 90 TABLET | Refills: 3 | Status: SHIPPED | OUTPATIENT
Start: 2021-03-26 | End: 2021-07-07 | Stop reason: SDUPTHER

## 2021-03-26 NOTE — TELEPHONE ENCOUNTER
10/2/2020  Future Appointments   Date Time Provider Lukasz Aysha   3/31/2021 12:30 PM [facility] COVID IMM, PFIZER 21 DAY SECOND DOSE [facility] I[facility]

## 2021-03-31 ENCOUNTER — IMMUNIZATION (OUTPATIENT)
Dept: PRIMARY CARE CLINIC | Age: 61
End: 2021-03-31
Payer: COMMERCIAL

## 2021-03-31 PROCEDURE — 0002A COVID-19, PFIZER VACCINE 30MCG/0.3ML DOSE: CPT | Performed by: FAMILY MEDICINE

## 2021-03-31 PROCEDURE — 91300 COVID-19, PFIZER VACCINE 30MCG/0.3ML DOSE: CPT | Performed by: FAMILY MEDICINE

## 2021-07-07 DIAGNOSIS — E03.9 ACQUIRED HYPOTHYROIDISM: ICD-10-CM

## 2021-07-07 DIAGNOSIS — F32.A DEPRESSION, UNSPECIFIED DEPRESSION TYPE: ICD-10-CM

## 2021-07-07 DIAGNOSIS — E78.2 MIXED HYPERLIPIDEMIA: ICD-10-CM

## 2021-07-09 RX ORDER — FLUOXETINE HYDROCHLORIDE 40 MG/1
CAPSULE ORAL
Qty: 180 CAPSULE | Refills: 3 | Status: SHIPPED | OUTPATIENT
Start: 2021-07-09 | End: 2021-08-03

## 2021-07-09 RX ORDER — TRAZODONE HYDROCHLORIDE 50 MG/1
TABLET ORAL
Qty: 135 TABLET | Refills: 3 | Status: SHIPPED | OUTPATIENT
Start: 2021-07-09 | End: 2021-08-03

## 2021-07-09 RX ORDER — ROSUVASTATIN CALCIUM 5 MG/1
TABLET, COATED ORAL
Qty: 90 TABLET | Refills: 3 | Status: SHIPPED | OUTPATIENT
Start: 2021-07-09 | End: 2022-08-23

## 2021-07-09 RX ORDER — BUPROPION HYDROCHLORIDE 300 MG/1
TABLET ORAL
Qty: 90 TABLET | Refills: 3 | Status: SHIPPED | OUTPATIENT
Start: 2021-07-09 | End: 2022-08-23

## 2021-07-09 RX ORDER — LEVOTHYROXINE SODIUM 0.15 MG/1
TABLET ORAL
Qty: 90 TABLET | Refills: 3 | Status: SHIPPED | OUTPATIENT
Start: 2021-07-09 | End: 2021-09-07 | Stop reason: SDUPTHER

## 2021-07-23 ENCOUNTER — HOSPITAL ENCOUNTER (OUTPATIENT)
Dept: MAMMOGRAPHY | Age: 61
Discharge: HOME | End: 2021-07-23
Payer: COMMERCIAL

## 2021-07-23 DIAGNOSIS — Z12.31 BREAST CANCER SCREENING BY MAMMOGRAM: ICD-10-CM

## 2021-07-23 PROCEDURE — 77063 BREAST TOMOSYNTHESIS BI: CPT

## 2021-08-03 DIAGNOSIS — F32.A DEPRESSION, UNSPECIFIED DEPRESSION TYPE: ICD-10-CM

## 2021-08-03 RX ORDER — TRAZODONE HYDROCHLORIDE 50 MG/1
TABLET ORAL
Qty: 135 TABLET | Refills: 3 | Status: SHIPPED | OUTPATIENT
Start: 2021-08-03 | End: 2022-08-23

## 2021-08-03 RX ORDER — FLUOXETINE HYDROCHLORIDE 40 MG/1
CAPSULE ORAL
Qty: 180 CAPSULE | Refills: 3 | Status: SHIPPED | OUTPATIENT
Start: 2021-08-03 | End: 2022-08-23

## 2021-08-03 NOTE — TELEPHONE ENCOUNTER
Last ov 10/02/2020   Future Appointments   Date Time Provider Lukasz Olmstead   9/3/2021  9:00 AM DO Jack Mcdonnell

## 2021-09-02 NOTE — PROGRESS NOTES
9/3/2021    Yoselyn Del Angel (:  1960) is a 59 y.o. female, here for a preventive medicine evaluation. Chief Complaint   Patient presents with    Annual Exam        Diagnosis Orders   1. Annual physical exam  TSH WITH REFLEX TO FT4    Lipid Panel    Comprehensive Metabolic Panel    CBC    Vitamin D 25 Hydroxy    Vitamin D 25 Hydroxy   2. Acquired hypothyroidism  TSH WITH REFLEX TO FT4   3. History of vitamin D deficiency  Vitamin D 25 Hydroxy    Vitamin D 25 Hydroxy     Isidra Sousa was seen today for annual exam.    Diagnoses and all orders for this visit:    Annual physical exam  -     TSH WITH REFLEX TO FT4  -     Lipid Panel  -     Comprehensive Metabolic Panel; Future  -     CBC  -     Vitamin D 25 Hydroxy; Future  -     Vitamin D 25 Hydroxy    Acquired hypothyroidism  -     TSH WITH REFLEX TO FT4    History of vitamin D deficiency  -     Vitamin D 25 Hydroxy; Future  -     Vitamin D 25 Hydroxy          Patient Active Problem List   Diagnosis    Lateral meniscal tear    Menopausal syndrome    Elevated liver enzymes    Mild single current episode of major depressive disorder (HCC)    Mixed hyperlipidemia    Acquired hypothyroidism    Essential hypertension       Review of Systems   Constitutional: Negative for unexpected weight change. HENT: Negative. Eyes: Negative for redness. Respiratory: Negative for shortness of breath. Cardiovascular: Negative for chest pain and leg swelling. Gastrointestinal: Negative for constipation, diarrhea, nausea and vomiting. Skin: Negative for pallor. Allergic/Immunologic: Negative for immunocompromised state. Psychiatric/Behavioral: Negative for confusion. Prior to Visit Medications    Medication Sig Taking?  Authorizing Provider   traZODone (DESYREL) 50 MG tablet TAKE ONE AND ONE-HALF TABLETS NIGHTLY Yes Rosa Maria Ours, DO   FLUoxetine (PROZAC) 40 MG capsule TAKE 2 CAPSULES DAILY Yes Rosa Maria Ours, DO   buPROPion (WELLBUTRIN XL) 300 MG extended release tablet TAKE 1 TABLET EVERY MORNING Yes Suhas Mcgowan, DO   levothyroxine (SYNTHROID) 150 MCG tablet TAKE 1 TABLET DAILY Yes Suhas Mcgowan, DO   rosuvastatin (CRESTOR) 5 MG tablet TAKE 1 TABLET DAILY Yes Suhas Mcgowan DO   vitamin D (CHOLECALCIFEROL) 1000 UNIT TABS tablet Take 1,000 Units by mouth daily Yes Historical Provider, MD   aspirin 81 MG EC tablet Take 81 mg by mouth daily. Yes Historical Provider, MD        Allergies   Allergen Reactions    Lipitor [Atorvastatin]     Sulfa Antibiotics        Past Medical History:   Diagnosis Date    Depression     Essential hypertension 2017    Hyperlipidemia     Hyperthyroidism        Past Surgical History:   Procedure Laterality Date     SECTION      x4    COLONOSCOPY      2010    HYSTERECTOMY      SERGIO AND BSO         Social History     Socioeconomic History    Marital status:      Spouse name: Not on file    Number of children: Not on file    Years of education: Not on file    Highest education level: Not on file   Occupational History    Not on file   Tobacco Use    Smoking status: Never Smoker    Smokeless tobacco: Never Used   Vaping Use    Vaping Use: Never used   Substance and Sexual Activity    Alcohol use: Yes    Drug use: Not on file    Sexual activity: Yes   Other Topics Concern    Not on file   Social History Narrative    Not on file     Social Determinants of Health     Financial Resource Strain: Low Risk     Difficulty of Paying Living Expenses: Not hard at all   Food Insecurity: No Food Insecurity    Worried About Running Out of Food in the Last Year: Never true    [address] in the Last Year: Never true   Transportation Needs:     Lack of Transportation (Medical):      Lack of Transportation (Non-Medical):    Physical Activity:     Days of Exercise per Week:     Minutes of Exercise per Session:    Stress:     Feeling of Stress :    Social Connections:     Frequency of Communication with Friends and Family:     Frequency of Social Gatherings with Friends and Family:     Attends Christian Services:     Active Member of Clubs or Organizations:     Attends Club or Organization Meetings:     Marital Status:    Intimate Partner Violence:     Fear of Current or Ex-Partner:     Emotionally Abused:     Physically Abused:     Sexually Abused:        Family History   Problem Relation Age of Onset    Diabetes Mother     High Cholesterol Mother     High Blood Pressure Mother     Arthritis Mother     Depression Mother     Heart Disease Mother     High Blood Pressure Father     Coronary Art Dis Father     Arthritis Father     Heart Disease Father     Coronary Art Dis Sister     Other Sister         mi x2    Heart Disease Sister     High Cholesterol Sister     Diabetes Maternal Grandmother     Coronary Art Dis Maternal Grandmother     Heart Disease Maternal Grandmother     Coronary Art Dis Paternal Grandfather     Heart Disease Paternal Grandfather            Vitals:    09/03/21 0851   BP: 112/78   Site: Left Upper Arm   Position: Sitting   Cuff Size: Medium Adult   Pulse: 61   SpO2: 98%   Weight: 153 lb (69.4 kg)   Height: 5' 2\" (1.575 m)     Estimated body mass index is 27.98 kg/m² as calculated from the following:    Height as of this encounter: 5' 2\" (1.575 m). Weight as of this encounter: 153 lb (69.4 kg). BP Readings from Last 5 Encounters:   09/03/21 112/78   10/02/20 116/86   12/11/19 124/84   11/13/19 126/84   08/26/19 110/74        Oriented to person, place, and time. HEENT:Normocephalic, atraumatic. No scleral icterus. Pupils normal  Heart: Regular Rate and Rhythm. No murmurs. Respirations: lungs clear to auscultation bilaterally. Abdomin: Bowel sounds present. Extremities: No peripheral edema. No erythema.         The 10-year ASCVD risk score (Hoang Yan et al., 2013) is: 2.7%    Values used to calculate the score:      Age: 59 years      Sex: Female      Is Non- : No      Diabetic: No      Tobacco smoker: No      Systolic Blood Pressure: 990 mmHg      Is BP treated: No      HDL Cholesterol: 70 mg/dL      Total Cholesterol: 166 mg/dL    Immunization History   Administered Date(s) Administered    COVID-19, Pfizer, PF, 30mcg/0.3mL 03/10/2021, 03/31/2021    Influenza Virus Vaccine 11/12/2020    Tdap (Boostrix, Adacel) 07/21/2014       Health Maintenance   Topic Date Due    Shingles Vaccine (1 of 2) Never done    Colon cancer screen colonoscopy  11/02/2019    Flu vaccine (1) 09/01/2021    Lipid screen  10/02/2021    TSH testing  10/02/2021    Potassium monitoring  10/02/2021    Creatinine monitoring  10/02/2021    Breast cancer screen  07/23/2023    DTaP/Tdap/Td vaccine (2 - Td or Tdap) 07/21/2024    COVID-19 Vaccine  Completed    Hepatitis C screen  Completed    HIV screen  Completed    Hepatitis A vaccine  Aged Out    Hepatitis B vaccine  Aged Out    Hib vaccine  Aged Out    Meningococcal (ACWY) vaccine  Aged Out    Pneumococcal 0-64 years Vaccine  Aged [employer] was used to authenticate this note. This note is dictated, errors are possible.   --Melissa Ta,  on 9/3/2021 at 10:48 AM

## 2021-09-03 ENCOUNTER — OFFICE VISIT (OUTPATIENT)
Dept: FAMILY MEDICINE CLINIC | Age: 61
End: 2021-09-03
Payer: COMMERCIAL

## 2021-09-03 VITALS
WEIGHT: 153 LBS | SYSTOLIC BLOOD PRESSURE: 112 MMHG | HEART RATE: 61 BPM | BODY MASS INDEX: 28.16 KG/M2 | HEIGHT: 62 IN | DIASTOLIC BLOOD PRESSURE: 78 MMHG | OXYGEN SATURATION: 98 %

## 2021-09-03 DIAGNOSIS — Z86.39 HISTORY OF VITAMIN D DEFICIENCY: ICD-10-CM

## 2021-09-03 DIAGNOSIS — E03.9 ACQUIRED HYPOTHYROIDISM: ICD-10-CM

## 2021-09-03 DIAGNOSIS — Z00.00 ANNUAL PHYSICAL EXAM: Primary | ICD-10-CM

## 2021-09-03 LAB
CHOLESTEROL, TOTAL: 172 MG/DL (ref 0–199)
HCT VFR BLD CALC: 44.7 % (ref 36–48)
HDLC SERPL-MCNC: 69 MG/DL (ref 40–60)
HEMOGLOBIN: 14.6 G/DL (ref 12–16)
LDL CHOLESTEROL CALCULATED: 91 MG/DL
MCH RBC QN AUTO: 30.5 PG (ref 26–34)
MCHC RBC AUTO-ENTMCNC: 32.7 G/DL (ref 31–36)
MCV RBC AUTO: 93.1 FL (ref 80–100)
PDW BLD-RTO: 12.9 % (ref 12.4–15.4)
PLATELET # BLD: 293 K/UL (ref 135–450)
PMV BLD AUTO: 9.1 FL (ref 5–10.5)
RBC # BLD: 4.8 M/UL (ref 4–5.2)
T4 FREE: 1.9 NG/DL (ref 0.9–1.8)
TRIGL SERPL-MCNC: 59 MG/DL (ref 0–150)
TSH REFLEX FT4: 0.01 UIU/ML (ref 0.27–4.2)
VITAMIN D 25-HYDROXY: 55.1 NG/ML
VLDLC SERPL CALC-MCNC: 12 MG/DL
WBC # BLD: 6.8 K/UL (ref 4–11)

## 2021-09-03 PROCEDURE — 36415 COLL VENOUS BLD VENIPUNCTURE: CPT | Performed by: FAMILY MEDICINE

## 2021-09-03 PROCEDURE — 99396 PREV VISIT EST AGE 40-64: CPT | Performed by: FAMILY MEDICINE

## 2021-09-03 SDOH — ECONOMIC STABILITY: FOOD INSECURITY: WITHIN THE PAST 12 MONTHS, YOU WORRIED THAT YOUR FOOD WOULD RUN OUT BEFORE YOU GOT MONEY TO BUY MORE.: NEVER TRUE

## 2021-09-03 SDOH — ECONOMIC STABILITY: FOOD INSECURITY: WITHIN THE PAST 12 MONTHS, THE FOOD YOU BOUGHT JUST DIDN'T LAST AND YOU DIDN'T HAVE MONEY TO GET MORE.: NEVER TRUE

## 2021-09-03 ASSESSMENT — SOCIAL DETERMINANTS OF HEALTH (SDOH): HOW HARD IS IT FOR YOU TO PAY FOR THE VERY BASICS LIKE FOOD, HOUSING, MEDICAL CARE, AND HEATING?: NOT HARD AT ALL

## 2021-09-07 DIAGNOSIS — E03.9 ACQUIRED HYPOTHYROIDISM: ICD-10-CM

## 2021-09-07 RX ORDER — LEVOTHYROXINE SODIUM 137 UG/1
137 TABLET ORAL DAILY
Qty: 30 TABLET | Refills: 11 | Status: SHIPPED | OUTPATIENT
Start: 2021-09-07 | End: 2021-10-05

## 2021-10-05 DIAGNOSIS — E03.9 ACQUIRED HYPOTHYROIDISM: ICD-10-CM

## 2021-10-05 RX ORDER — LEVOTHYROXINE SODIUM 137 UG/1
TABLET ORAL
Qty: 30 TABLET | Refills: 11 | Status: SHIPPED | OUTPATIENT
Start: 2021-10-05 | End: 2021-10-14 | Stop reason: SDUPTHER

## 2021-10-14 DIAGNOSIS — E03.9 ACQUIRED HYPOTHYROIDISM: ICD-10-CM

## 2021-10-14 RX ORDER — LEVOTHYROXINE SODIUM 137 UG/1
TABLET ORAL
Qty: 90 TABLET | Refills: 3 | Status: SHIPPED | OUTPATIENT
Start: 2021-10-14 | End: 2021-10-20 | Stop reason: SDUPTHER

## 2021-10-14 NOTE — TELEPHONE ENCOUNTER
Angel Helm is requesting levothyroxine 137 mcg rx go to [facility] mail order [facility] of [facility].    No future appointments. Past appointment was 9/3/21.

## 2021-10-20 DIAGNOSIS — E03.9 ACQUIRED HYPOTHYROIDISM: ICD-10-CM

## 2021-10-20 RX ORDER — LEVOTHYROXINE SODIUM 137 UG/1
TABLET ORAL
Qty: 90 TABLET | Refills: 3 | Status: SHIPPED | OUTPATIENT
Start: 2021-10-20

## 2022-07-25 ENCOUNTER — HOSPITAL ENCOUNTER (OUTPATIENT)
Dept: MAMMOGRAPHY | Age: 62
Discharge: HOME | End: 2022-07-25
Payer: COMMERCIAL

## 2022-07-25 DIAGNOSIS — Z12.31 BREAST CANCER SCREENING BY MAMMOGRAM: ICD-10-CM

## 2022-07-25 PROCEDURE — 77063 BREAST TOMOSYNTHESIS BI: CPT

## 2022-08-22 DIAGNOSIS — F32.A DEPRESSION, UNSPECIFIED DEPRESSION TYPE: ICD-10-CM

## 2022-08-22 DIAGNOSIS — E78.2 MIXED HYPERLIPIDEMIA: ICD-10-CM

## 2022-08-23 RX ORDER — BUPROPION HYDROCHLORIDE 300 MG/1
TABLET ORAL
Qty: 90 TABLET | Refills: 0 | Status: SHIPPED | OUTPATIENT
Start: 2022-08-23

## 2022-08-23 RX ORDER — TRAZODONE HYDROCHLORIDE 50 MG/1
TABLET ORAL
Qty: 135 TABLET | Refills: 0 | Status: SHIPPED | OUTPATIENT
Start: 2022-08-23

## 2022-08-23 RX ORDER — ROSUVASTATIN CALCIUM 5 MG/1
TABLET, COATED ORAL
Qty: 90 TABLET | Refills: 0 | Status: SHIPPED | OUTPATIENT
Start: 2022-08-23

## 2022-08-23 RX ORDER — FLUOXETINE HYDROCHLORIDE 40 MG/1
CAPSULE ORAL
Qty: 180 CAPSULE | Refills: 0 | Status: SHIPPED | OUTPATIENT
Start: 2022-08-23

## 2022-08-23 NOTE — TELEPHONE ENCOUNTER
Called to help pt get scheduled, call got dropped, very hard to understand must have been a bad connection. If pt calls back help schedule.  Ty

## 2022-08-23 NOTE — TELEPHONE ENCOUNTER
Future Appointments   Date Time Provider Lukasz Olmstead   10/28/2022 10:20 AM Misa Penn DO [address]

## 2022-10-26 NOTE — PROGRESS NOTES
10/28/2022    Laurence Conrad (:  1960) is a 57 y.o. female, here for a preventive medicine evaluation. Chief Complaint   Patient presents with    Annual Exam        Diagnosis Orders   1. Annual physical exam  CBC    Comprehensive Metabolic Panel    Lipid Panel    TSH with Reflex to FT4      2. Acquired hypothyroidism  TSH with Reflex to FT4        Magan Arechiga was seen today for annual exam.    Diagnoses and all orders for this visit:    Annual physical exam  -     CBC; Future  -     Comprehensive Metabolic Panel; Future  -     Lipid Panel; Future  -     TSH with Reflex to FT4; Future    Acquired hypothyroidism  -     TSH with Reflex to FT4; Future      Shingles shot #1 given today  Patient Active Problem List   Diagnosis    Lateral meniscal tear    Menopausal syndrome    Elevated liver enzymes    Mild single current episode of major depressive disorder (HCC)    Mixed hyperlipidemia    Acquired hypothyroidism    Essential hypertension       Review of Systems   Constitutional: Negative for unexpected weight change. HENT: Negative. Eyes: Negative for redness. Respiratory: Negative for shortness of breath. Cardiovascular: Negative for chest pain and leg swelling. Gastrointestinal: Negative for constipation, diarrhea, nausea and vomiting. Skin: Negative for pallor. Allergic/Immunologic: Negative for immunocompromised state. Psychiatric/Behavioral: Negative for confusion. Prior to Visit Medications    Medication Sig Taking?  Authorizing Provider   estradiol (ESTRACE) 0.1 MG/GM vaginal cream PLACE 0.5GM VAGINALLY TWICE WEEKLY Yes Historical Provider, MD   buPROPion (WELLBUTRIN XL) 300 MG extended release tablet TAKE 1 TABLET EVERY MORNING Yes Robbie Riojas DO   rosuvastatin (CRESTOR) 5 MG tablet TAKE 1 TABLET EVERY DAY Yes Robbie Riojas DO   traZODone (DESYREL) 50 MG tablet TAKE 1 AND 1/2 TABLETS EVERY NIGHT Yes Robbie Riojas DO   FLUoxetine (PROZAC) 40 MG capsule TAKE 2 CAPSULES EVERY DAY Yes Vinita Wallace Eliazar, DO   levothyroxine (SYNTHROID) 137 MCG tablet TAKE 1 TABLET EVERY DAY Yes Lacy Armenta, DO   vitamin D (CHOLECALCIFEROL) 1000 UNIT TABS tablet Take 1,000 Units by mouth daily Yes Historical Provider, MD   aspirin 81 MG EC tablet Take 81 mg by mouth daily.  Yes Historical Provider, MD        Allergies   Allergen Reactions    Lipitor [Atorvastatin]     Sulfa Antibiotics        Past Medical History:   Diagnosis Date    Depression     Essential hypertension 2017    Hyperlipidemia     Hyperthyroidism        Past Surgical History:   Procedure Laterality Date     SECTION      x4    COLONOSCOPY      2010    HYSTERECTOMY (CERVIX STATUS UNKNOWN)      SERGIO AND BSO (CERVIX REMOVED)         Social History     Socioeconomic History    Marital status:      Spouse name: Not on file    Number of children: Not on file    Years of education: Not on file    Highest education level: Not on file   Occupational History    Not on file   Tobacco Use    Smoking status: Never    Smokeless tobacco: Never   Vaping Use    Vaping Use: Never used   Substance and Sexual Activity    Alcohol use: Yes    Drug use: Not on file    Sexual activity: Yes   Other Topics Concern    Not on file   Social History Narrative    Not on file     Social Determinants of Health     Financial Resource Strain: Not on file   Food Insecurity: Not on file   Transportation Needs: Not on file   Physical Activity: Not on file   Stress: Not on file   Social Connections: Not on file   Intimate Partner Violence: Not on file   Housing Stability: Not on file       Family History   Problem Relation Age of Onset    Diabetes Mother     High Cholesterol Mother     High Blood Pressure Mother     Arthritis Mother     Depression Mother     Heart Disease Mother     High Blood Pressure Father     Coronary Art Dis Father     Arthritis Father     Heart Disease Father     Coronary Art Dis Sister     Other Sister         mi x2    Heart Disease Sister     High Cholesterol Sister     Diabetes Maternal Grandmother     Coronary Art Dis Maternal Grandmother     Heart Disease Maternal Grandmother     Coronary Art Dis Paternal Grandfather     Heart Disease Paternal Grandfather            Vitals:    10/28/22 1029   BP: 114/82   Pulse: 67   Resp: 16   Temp: 97.3 °F (36.3 °C)   SpO2: 98%   Weight: 148 lb (67.1 kg)   Height: 5' 2.4\" (1.585 m)     Estimated body mass index is 26.72 kg/m² as calculated from the following:    Height as of this encounter: 5' 2.4\" (1.585 m). Weight as of this encounter: 148 lb (67.1 kg). BP Readings from Last 5 Encounters:   10/28/22 114/82   09/03/21 112/78   10/02/20 116/86   12/11/19 124/84   11/13/19 126/84        Oriented to person, place, and time. HEENT:Normocephalic, atraumatic. No scleral icterus. Pupils normal  Heart: Regular Rate and Rhythm. No murmurs. Respirations: lungs clear to auscultation bilaterally. Abdomin: Bowel sounds present. Extremities: No peripheral edema. No erythema.         The 10-year ASCVD risk score (Ptee DK, et al., 2019) is: 3.3%    Values used to calculate the score:      Age: 57 years      Sex: Female      Is Non- : No      Diabetic: No      Tobacco smoker: No      Systolic Blood Pressure: 281 mmHg      Is BP treated: No      HDL Cholesterol: 69 mg/dL      Total Cholesterol: 172 mg/dL    Immunization History   Administered Date(s) Administered    COVID-19, PFIZER PURPLE top, DILUTE for use, (age 15 y+), 30mcg/0.3mL 03/10/2021, 03/31/2021, 12/06/2021    Influenza Virus Vaccine 11/12/2020, 10/18/2022    Tdap (Boostrix, Adacel) 07/21/2014       Health Maintenance   Topic Date Due    Depression Monitoring  Never done    Shingles vaccine (1 of 2) Never done    COVID-19 Vaccine (4 - Booster for Pfizer series) 01/31/2022    Lipids  09/03/2022    DTaP/Tdap/Td vaccine (2 - Td or Tdap) 07/21/2024    Breast cancer screen  07/25/2024    Colorectal Cancer Screen  09/22/2031    Flu vaccine Completed    Hepatitis C screen  Completed    HIV screen  Completed    Hepatitis A vaccine  Aged Out    Hib vaccine  Aged Out    Meningococcal (ACWY) vaccine  Aged Out    Pneumococcal 0-64 years Vaccine  Aged [employer] was used to authenticate this note. This note is dictated, errors are possible.   --Nathalie Enriquez,  on 10/28/2022 at 10:44 AM

## 2022-10-28 ENCOUNTER — OFFICE VISIT (OUTPATIENT)
Dept: FAMILY MEDICINE CLINIC | Age: 62
End: 2022-10-28
Payer: COMMERCIAL

## 2022-10-28 VITALS
SYSTOLIC BLOOD PRESSURE: 114 MMHG | WEIGHT: 148 LBS | HEART RATE: 67 BPM | DIASTOLIC BLOOD PRESSURE: 82 MMHG | RESPIRATION RATE: 16 BRPM | TEMPERATURE: 97.3 F | OXYGEN SATURATION: 98 % | BODY MASS INDEX: 27.23 KG/M2 | HEIGHT: 62 IN

## 2022-10-28 DIAGNOSIS — E03.9 ACQUIRED HYPOTHYROIDISM: ICD-10-CM

## 2022-10-28 DIAGNOSIS — Z00.00 ANNUAL PHYSICAL EXAM: Primary | ICD-10-CM

## 2022-10-28 DIAGNOSIS — Z00.00 ANNUAL PHYSICAL EXAM: ICD-10-CM

## 2022-10-28 LAB
A/G RATIO: 2 (ref 1.1–2.2)
ALBUMIN SERPL-MCNC: 4.4 G/DL (ref 3.4–5)
ALP BLD-CCNC: 45 U/L (ref 40–129)
ALT SERPL-CCNC: 33 U/L (ref 10–40)
ANION GAP SERPL CALCULATED.3IONS-SCNC: 11 MMOL/L (ref 3–16)
AST SERPL-CCNC: 24 U/L (ref 15–37)
BILIRUB SERPL-MCNC: 0.3 MG/DL (ref 0–1)
BUN BLDV-MCNC: 11 MG/DL (ref 7–20)
CALCIUM SERPL-MCNC: 9.6 MG/DL (ref 8.3–10.6)
CHLORIDE BLD-SCNC: 106 MMOL/L (ref 99–110)
CHOLESTEROL, TOTAL: 194 MG/DL (ref 0–199)
CO2: 24 MMOL/L (ref 21–32)
CREAT SERPL-MCNC: 0.8 MG/DL (ref 0.6–1.2)
GFR SERPL CREATININE-BSD FRML MDRD: >60 ML/MIN/{1.73_M2}
GLUCOSE BLD-MCNC: 86 MG/DL (ref 70–99)
HCT VFR BLD CALC: 42.7 % (ref 36–48)
HDLC SERPL-MCNC: 76 MG/DL (ref 40–60)
HEMOGLOBIN: 14.6 G/DL (ref 12–16)
LDL CHOLESTEROL CALCULATED: 105 MG/DL
MCH RBC QN AUTO: 31.7 PG (ref 26–34)
MCHC RBC AUTO-ENTMCNC: 34.3 G/DL (ref 31–36)
MCV RBC AUTO: 92.6 FL (ref 80–100)
PDW BLD-RTO: 12.4 % (ref 12.4–15.4)
PLATELET # BLD: 274 K/UL (ref 135–450)
PMV BLD AUTO: 9.4 FL (ref 5–10.5)
POTASSIUM SERPL-SCNC: 4.7 MMOL/L (ref 3.5–5.1)
RBC # BLD: 4.62 M/UL (ref 4–5.2)
SODIUM BLD-SCNC: 141 MMOL/L (ref 136–145)
T4 FREE: 1.8 NG/DL (ref 0.9–1.8)
TOTAL PROTEIN: 6.6 G/DL (ref 6.4–8.2)
TRIGL SERPL-MCNC: 65 MG/DL (ref 0–150)
TSH REFLEX FT4: 0.02 UIU/ML (ref 0.27–4.2)
VLDLC SERPL CALC-MCNC: 13 MG/DL
WBC # BLD: 7.1 K/UL (ref 4–11)

## 2022-10-28 PROCEDURE — 90471 IMMUNIZATION ADMIN: CPT | Performed by: FAMILY MEDICINE

## 2022-10-28 PROCEDURE — 3074F SYST BP LT 130 MM HG: CPT | Performed by: FAMILY MEDICINE

## 2022-10-28 PROCEDURE — 90750 HZV VACC RECOMBINANT IM: CPT | Performed by: FAMILY MEDICINE

## 2022-10-28 PROCEDURE — 3078F DIAST BP <80 MM HG: CPT | Performed by: FAMILY MEDICINE

## 2022-10-28 PROCEDURE — 99396 PREV VISIT EST AGE 40-64: CPT | Performed by: FAMILY MEDICINE

## 2022-10-28 RX ORDER — ESTRADIOL 0.1 MG/G
CREAM VAGINAL
COMMUNITY
Start: 2022-09-09

## 2022-10-28 ASSESSMENT — PATIENT HEALTH QUESTIONNAIRE - PHQ9
4. FEELING TIRED OR HAVING LITTLE ENERGY: 0
6. FEELING BAD ABOUT YOURSELF - OR THAT YOU ARE A FAILURE OR HAVE LET YOURSELF OR YOUR FAMILY DOWN: 0
SUM OF ALL RESPONSES TO PHQ QUESTIONS 1-9: 0
9. THOUGHTS THAT YOU WOULD BE BETTER OFF DEAD, OR OF HURTING YOURSELF: 0
5. POOR APPETITE OR OVEREATING: 0
SUM OF ALL RESPONSES TO PHQ QUESTIONS 1-9: 0
SUM OF ALL RESPONSES TO PHQ QUESTIONS 1-9: 0
10. IF YOU CHECKED OFF ANY PROBLEMS, HOW DIFFICULT HAVE THESE PROBLEMS MADE IT FOR YOU TO DO YOUR WORK, TAKE CARE OF THINGS AT HOME, OR GET ALONG WITH OTHER PEOPLE: 0
8. MOVING OR SPEAKING SO SLOWLY THAT OTHER PEOPLE COULD HAVE NOTICED. OR THE OPPOSITE, BEING SO FIGETY OR RESTLESS THAT YOU HAVE BEEN MOVING AROUND A LOT MORE THAN USUAL: 0
2. FEELING DOWN, DEPRESSED OR HOPELESS: 0
1. LITTLE INTEREST OR PLEASURE IN DOING THINGS: 0
7. TROUBLE CONCENTRATING ON THINGS, SUCH AS READING THE NEWSPAPER OR WATCHING TELEVISION: 0
3. TROUBLE FALLING OR STAYING ASLEEP: 0
SUM OF ALL RESPONSES TO PHQ QUESTIONS 1-9: 0
SUM OF ALL RESPONSES TO PHQ9 QUESTIONS 1 & 2: 0

## 2022-10-28 ASSESSMENT — ANXIETY QUESTIONNAIRES
7. FEELING AFRAID AS IF SOMETHING AWFUL MIGHT HAPPEN: 0
3. WORRYING TOO MUCH ABOUT DIFFERENT THINGS: 0
6. BECOMING EASILY ANNOYED OR IRRITABLE: 0
1. FEELING NERVOUS, ANXIOUS, OR ON EDGE: 0
GAD7 TOTAL SCORE: 0
2. NOT BEING ABLE TO STOP OR CONTROL WORRYING: 0
4. TROUBLE RELAXING: 0
5. BEING SO RESTLESS THAT IT IS HARD TO SIT STILL: 0
IF YOU CHECKED OFF ANY PROBLEMS ON THIS QUESTIONNAIRE, HOW DIFFICULT HAVE THESE PROBLEMS MADE IT FOR YOU TO DO YOUR WORK, TAKE CARE OF THINGS AT HOME, OR GET ALONG WITH OTHER PEOPLE: NOT DIFFICULT AT ALL

## 2022-10-28 NOTE — PATIENT INSTRUCTIONS
Your fasting blood sugar should be below 100. If it is between 100-125 that is considered high and known as prediabetes, or  impaired glucose tolerance. If your blood sugar is too high, go to diabetes. org for a diabetes prevention diet. The A1c shows your average blood sugar over the previous 3 months. It is sometimes ordered if your blood sugar is elevated  You do not have to be fasting to get it drawn. If it is 5.6 or below it means your blood sugar is in the normal range  If between 5.7 and 6.5 it is impaired glucose tolerance. If it is above 6.5 it is consistent with the diagnosis of  diabetes. Decrease high carb foods if your blood sugar is high. High carbohydrate foods are:  Breads, muffins, rolls, and bagels  Pasta, rice, corn, and grains  Potatoes, sweet potatoes  Legumes: peas beans lentils. Milk ( almond milk ok)  Most fruit except berries  Cake cookies pies ice cream candy etc.  Juices, soda, sweetened ice tea  Beer. Cholesterol, the ideal numbers explained: Total cholesterol should be below 200  The triglycerides should be below 150  The HDL, the good cholesterol should be above 40  The LDL, the bad cholesterol should be below 100. Although it can be as high as 130 if no risk factors for heart disease or no diabetes. Improve your cholesterol profile:     Cardiovascular exercise helps. Start with 15 minutes three times a week and the work up to at least 30 minutes 5 days a week. Exercise at a level that you can carry on a conversation during. Loose extra pounds. Pay attention to your serving sides and avoid eating second helpings at meals. Significantly decrease the amount of processed food, junk food, and fast food that you eat. Decrease animal sources of saturated fats, and completely avoid and eliminate  hydrogenated oils and trans fats. Check the Food Label on the food you eat and avoid foods that have hydrogenated vegetable oils in them.  That includes bakery products. Drink skim milk which contains no fat. Increase the amount of fresh food that you cook yourself. Eat at least five servings of fruits and vegetables a day. Increase the portion of your plate that contains the fruit and vegetables to at least 50%,-70%,  and decrease the amount of starches like potatoes, rice, pasta to no more than 25% of your plate. Increase your fiber intake. Fiber is found in fruits and vegetables as well as whole grains, oatmeal,  and beans. A diet with at least 5 servings of fruits and vegetables should give you about 10-20grams of fiber daily. Switch to monounsaturated fats like olive oil. Fat from olives, avocados, coconut, or other nuts is okay. Add baked or grilled fish to you diet at least 1-2 times a week. Learn more about cholesterol on the Internet. Check out these resources:    American Heart Association   Heart. [address]:   [place]    Triglycerides can be lowered without medication by exercising, and loosing weight and eating a diet lower in carbohydrates especially from flour sources,  and avoiding simple sugars. The good cholesterol is HDL. In order to increase the good cholesterol, increasing cardiovascular exercise helps. Avoid hydrogenated oils (trans fats) in processed, bakery,  and junk food. Use monounsaturated fats such as olive oil can help raise the good cholesterol. For your weight, exercise 30 minutes 5 times weekly and improve the types of food you eat:    Protein  Best options: The American Diabetes Association (ADA) recommends lean proteins low in saturated fat, like fish or turkey. Aim for two servings of seafood each week; some fish, like salmon, have the added benefit of containing heart healthy omega-3 fats. For a vegetarian protein source, experiment with the wide variety of beans. Nuts, which are protein and healthy fats powerhouses, are also a choice.   Worst options: Processed deli meats and hot dogs have high amounts of fat along with lots of sodium, which can increase the risk of high blood pressure. Heart attack and stroke are two common complications of diabetes, so keeping blood pressure in check is important. Grains  Best options: When choosing grains, make sure theyre whole. Decrease the amount of foods which contain flour. Whole grains such as wild rice, quinoa, and whole grain breads and cereals contain fiber, which is beneficial for digestive health, but often the grains flour products raise your blood sugar and when your blood sugar returns to normal, it can trigger the desire to eat again. Worst options: Refined white flour doesnt contain the same health benefits as whole grains. Processed foods made with white flour include breakfast cereals, white bread, and pastries, cookies, muffins, pretzels, crackers, so avoid these options. Also try to steer clear of white rice and pasta. Dairy  Best options: With only 6 to 8 grams of carbohydrates in a serving, plain nonfat [place] yogurt is a healthy and versatile dairy option. You can add berries and enjoy it for dessert or breakfast; you can use it in recipes as a replacement for sour cream, which is high in saturated fat. Skim milk. Worst options: Avoid all full-fat dairy products and especially packaged chocolate milk, as it also has added sugar. Avoid yogurts with added sugar. Vegetables  Best options: Non-starchy vegetables such as leafy greens, broccoli, cauliflower, asparagus, and carrots are low in carbohydrates and high in fiber and other nutrients, Tere Foster says. You can eat non-starchy vegetables in abundance -- half of your plate should be filled with these veggies. If youre craving mashed potatoes, give mashed cauliflower a try. Worst options: Stick to small portions of starchy vegetables such as corn, potatoes, and peas. These items are nutritious, but should be eaten in moderation.  The ADA groups them with grains because of their high carb content. Fruit  Best options: Fresh fruit can conquer your craving for sweets while providing antioxidants and fiber. Berries are a great option because recommended portion sizes are typically generous, which may leave you feeling more satisfied  Worst options: Avoid added sugar by eliminating fruits canned in syrup, and be aware that dried fruits have a very high sugar concentration. Also, fruit juices should be consumed rarely as theyre high in sugar and dont contain the same nutrients as whole fruit. Fats  Best options: Some types of fat actually help protect your heart. Choose the monounsaturated fats found in avocados, almonds, olives, and pecans or the polyunsaturated fats found in walnuts and sunflower oil, which can help to lower bad cholesterol. Worst options: Saturated fats increase bad cholesterol, so limit butter, cheese, gravy, and fried foods. Keep calories from animal sources of saturated fat to less than 10 percent of your total daily intake. Trans fats are even worse than saturated fats, so avoid them completely. Look for the term hydrogenated on labels of processed foods such as packaged snacks, baked goods, and crackers. For more help losing weight:      Keep track of what you eat everyday :    Lose it! adriana for the smart phone, or loseit. com or [employer]Newstag are free online tools you can use to track your daily intake. The online tools will help you establish your goals for weight loss and how much you should eat a day to accomplish the goal.     A BMI (body mass index) of 25 to 29 is considered overweight, a BMI of 30 and above is considered obese. BMI is found with your vitals on your After Visit Summary. In addition to the above diet info,   Iincrease the amount of fruit and vegetables you eat each day. Ideally you are eating at least 5 servings of fruit and or vegetables a day. Drink at least 50 ounces of water a day.     Avoid or decrease processed food, fast food, and junk food. Begin to exercise 15 minutes three times a week doing cardiovascular exercise. Don't quit. It can take 12 weeks to break old habits before you feel like you are in a new routine. Then you have to live your new lifestyle. Once you are adjusted to your new habits you will not have to keep recording your daily intake, you should have a good idea of what is a normal amount for you to eat each day. If you are obese, have elevated blood sugar, heart disease or a medical condition that is worsened by excess weight, I can refer you to a [employer] for education, usually covered by insurance.

## 2022-11-18 DIAGNOSIS — E03.9 ACQUIRED HYPOTHYROIDISM: ICD-10-CM

## 2022-11-18 RX ORDER — LEVOTHYROXINE SODIUM 137 UG/1
TABLET ORAL
Qty: 90 TABLET | Refills: 3 | Status: SHIPPED | OUTPATIENT
Start: 2022-11-18

## 2023-01-19 ENCOUNTER — NURSE ONLY (OUTPATIENT)
Dept: FAMILY MEDICINE CLINIC | Age: 63
End: 2023-01-19
Payer: COMMERCIAL

## 2023-01-19 PROCEDURE — 90750 HZV VACC RECOMBINANT IM: CPT | Performed by: FAMILY MEDICINE

## 2023-01-19 PROCEDURE — 90471 IMMUNIZATION ADMIN: CPT | Performed by: FAMILY MEDICINE

## 2023-02-15 DIAGNOSIS — E78.2 MIXED HYPERLIPIDEMIA: ICD-10-CM

## 2023-02-15 DIAGNOSIS — F32.A DEPRESSION, UNSPECIFIED DEPRESSION TYPE: ICD-10-CM

## 2023-02-15 RX ORDER — ROSUVASTATIN CALCIUM 5 MG/1
TABLET, COATED ORAL
Qty: 90 TABLET | Refills: 0 | Status: SHIPPED | OUTPATIENT
Start: 2023-02-15

## 2023-02-15 RX ORDER — BUPROPION HYDROCHLORIDE 300 MG/1
TABLET ORAL
Qty: 90 TABLET | Refills: 0 | Status: SHIPPED | OUTPATIENT
Start: 2023-02-15

## 2023-02-15 RX ORDER — FLUOXETINE HYDROCHLORIDE 40 MG/1
CAPSULE ORAL
Qty: 180 CAPSULE | Refills: 0 | Status: SHIPPED | OUTPATIENT
Start: 2023-02-15

## 2023-02-15 RX ORDER — TRAZODONE HYDROCHLORIDE 50 MG/1
TABLET ORAL
Qty: 135 TABLET | Refills: 0 | Status: SHIPPED | OUTPATIENT
Start: 2023-02-15

## 2023-02-16 ENCOUNTER — APPOINTMENT (RX ONLY)
Dept: URBAN - METROPOLITAN AREA CLINIC 170 | Facility: CLINIC | Age: 63
Setting detail: DERMATOLOGY
End: 2023-02-16

## 2023-02-16 DIAGNOSIS — L81.4 OTHER MELANIN HYPERPIGMENTATION: ICD-10-CM | Status: STABLE

## 2023-02-16 DIAGNOSIS — L71.0 PERIORAL DERMATITIS: ICD-10-CM | Status: INADEQUATELY CONTROLLED

## 2023-02-16 DIAGNOSIS — L82.1 OTHER SEBORRHEIC KERATOSIS: ICD-10-CM | Status: STABLE

## 2023-02-16 DIAGNOSIS — D22 MELANOCYTIC NEVI: ICD-10-CM | Status: STABLE

## 2023-02-16 DIAGNOSIS — L57.0 ACTINIC KERATOSIS: ICD-10-CM

## 2023-02-16 DIAGNOSIS — D18.0 HEMANGIOMA: ICD-10-CM | Status: STABLE

## 2023-02-16 DIAGNOSIS — Z85.828 PERSONAL HISTORY OF OTHER MALIGNANT NEOPLASM OF SKIN: ICD-10-CM

## 2023-02-16 PROBLEM — D18.01 HEMANGIOMA OF SKIN AND SUBCUTANEOUS TISSUE: Status: ACTIVE | Noted: 2023-02-16

## 2023-02-16 PROBLEM — D22.5 MELANOCYTIC NEVI OF TRUNK: Status: ACTIVE | Noted: 2023-02-16

## 2023-02-16 PROCEDURE — ? FULL BODY SKIN EXAM

## 2023-02-16 PROCEDURE — ? PRESCRIPTION

## 2023-02-16 PROCEDURE — ? COUNSELING

## 2023-02-16 PROCEDURE — ? LIQUID NITROGEN

## 2023-02-16 PROCEDURE — ? ADDITIONAL NOTES

## 2023-02-16 PROCEDURE — 17000 DESTRUCT PREMALG LESION: CPT

## 2023-02-16 PROCEDURE — 99214 OFFICE O/P EST MOD 30 MIN: CPT | Mod: 25

## 2023-02-16 PROCEDURE — ? TREATMENT REGIMEN

## 2023-02-16 RX ORDER — DOXYCYCLINE HYCLATE 100 MG/1
CAPSULE, GELATIN COATED ORAL
Qty: 60 | Refills: 2 | Status: ERX

## 2023-02-16 RX ORDER — CLINDAMYCIN PHOSPHATE 10 MG/ML
LOTION TOPICAL
Qty: 60 | Refills: 11 | Status: ERX

## 2023-02-16 ASSESSMENT — LOCATION SIMPLE DESCRIPTION DERM
LOCATION SIMPLE: NOSE
LOCATION SIMPLE: UPPER BACK
LOCATION SIMPLE: ABDOMEN
LOCATION SIMPLE: LEFT NOSE
LOCATION SIMPLE: RIGHT UPPER BACK
LOCATION SIMPLE: RIGHT CHEEK

## 2023-02-16 ASSESSMENT — LOCATION DETAILED DESCRIPTION DERM
LOCATION DETAILED: LEFT NASAL ALA
LOCATION DETAILED: RIGHT INFERIOR MEDIAL MALAR CHEEK
LOCATION DETAILED: NASAL DORSUM
LOCATION DETAILED: EPIGASTRIC SKIN
LOCATION DETAILED: RIGHT LATERAL ABDOMEN
LOCATION DETAILED: INFERIOR THORACIC SPINE
LOCATION DETAILED: RIGHT MID-UPPER BACK

## 2023-02-16 ASSESSMENT — LOCATION ZONE DERM
LOCATION ZONE: NOSE
LOCATION ZONE: TRUNK
LOCATION ZONE: FACE

## 2023-02-16 NOTE — HPI: EVALUATION OF SKIN LESION(S)
What Type Of Note Output Would You Prefer (Optional)?: Bullet Format
Hpi Title: Evaluation of Skin Lesions
Have Your Spot(S) Been Treated In The Past?: has been treated
Family Member: Dad
Additional History: Check scaly patch on nose, patient doesn’t want treated until after daughter wedding if needed.

## 2023-02-16 NOTE — PROCEDURE: LIQUID NITROGEN
Duration Of Freeze Thaw-Cycle (Seconds): 5
Post-Care Instructions: I reviewed with the patient in detail post-care instructions. Patient is to wear sunprotection, and avoid picking at any of the treated lesions. Pt may apply Vaseline to crusted or scabbing areas.
Number Of Freeze-Thaw Cycles: 1 freeze-thaw cycle
Render Post-Care Instructions In Note?: yes
Render Note In Bullet Format When Appropriate: No
Detail Level: Detailed
Consent: The patient's consent was obtained including but not limited to risks of crusting, scabbing, blistering, scarring, darker or lighter pigmentary change, recurrence, incomplete removal and infection.

## 2023-03-17 ENCOUNTER — OFFICE VISIT (OUTPATIENT)
Dept: FAMILY MEDICINE CLINIC | Age: 63
End: 2023-03-17
Payer: COMMERCIAL

## 2023-03-17 VITALS
DIASTOLIC BLOOD PRESSURE: 74 MMHG | HEART RATE: 59 BPM | RESPIRATION RATE: 18 BRPM | BODY MASS INDEX: 26.54 KG/M2 | WEIGHT: 147 LBS | SYSTOLIC BLOOD PRESSURE: 116 MMHG | OXYGEN SATURATION: 95 %

## 2023-03-17 DIAGNOSIS — L81.9 ATYPICAL PIGMENTED SKIN LESION: Primary | ICD-10-CM

## 2023-03-17 PROCEDURE — 99213 OFFICE O/P EST LOW 20 MIN: CPT | Performed by: NURSE PRACTITIONER

## 2023-03-17 PROCEDURE — 3078F DIAST BP <80 MM HG: CPT | Performed by: NURSE PRACTITIONER

## 2023-03-17 PROCEDURE — 3074F SYST BP LT 130 MM HG: CPT | Performed by: NURSE PRACTITIONER

## 2023-03-17 SDOH — ECONOMIC STABILITY: FOOD INSECURITY: WITHIN THE PAST 12 MONTHS, THE FOOD YOU BOUGHT JUST DIDN'T LAST AND YOU DIDN'T HAVE MONEY TO GET MORE.: NEVER TRUE

## 2023-03-17 SDOH — ECONOMIC STABILITY: FOOD INSECURITY: WITHIN THE PAST 12 MONTHS, YOU WORRIED THAT YOUR FOOD WOULD RUN OUT BEFORE YOU GOT MONEY TO BUY MORE.: NEVER TRUE

## 2023-03-17 SDOH — ECONOMIC STABILITY: HOUSING INSECURITY
IN THE LAST 12 MONTHS, WAS THERE A TIME WHEN YOU DID NOT HAVE A STEADY PLACE TO SLEEP OR SLEPT IN A SHELTER (INCLUDING NOW)?: NO

## 2023-03-17 SDOH — ECONOMIC STABILITY: INCOME INSECURITY: HOW HARD IS IT FOR YOU TO PAY FOR THE VERY BASICS LIKE FOOD, HOUSING, MEDICAL CARE, AND HEATING?: NOT HARD AT ALL

## 2023-03-17 ASSESSMENT — PATIENT HEALTH QUESTIONNAIRE - PHQ9
SUM OF ALL RESPONSES TO PHQ QUESTIONS 1-9: 0
2. FEELING DOWN, DEPRESSED OR HOPELESS: 0
SUM OF ALL RESPONSES TO PHQ QUESTIONS 1-9: 0
SUM OF ALL RESPONSES TO PHQ QUESTIONS 1-9: 0
10. IF YOU CHECKED OFF ANY PROBLEMS, HOW DIFFICULT HAVE THESE PROBLEMS MADE IT FOR YOU TO DO YOUR WORK, TAKE CARE OF THINGS AT HOME, OR GET ALONG WITH OTHER PEOPLE: 0
7. TROUBLE CONCENTRATING ON THINGS, SUCH AS READING THE NEWSPAPER OR WATCHING TELEVISION: 0
SUM OF ALL RESPONSES TO PHQ9 QUESTIONS 1 & 2: 0
5. POOR APPETITE OR OVEREATING: 0
1. LITTLE INTEREST OR PLEASURE IN DOING THINGS: 0
9. THOUGHTS THAT YOU WOULD BE BETTER OFF DEAD, OR OF HURTING YOURSELF: 0
6. FEELING BAD ABOUT YOURSELF - OR THAT YOU ARE A FAILURE OR HAVE LET YOURSELF OR YOUR FAMILY DOWN: 0
SUM OF ALL RESPONSES TO PHQ QUESTIONS 1-9: 0
8. MOVING OR SPEAKING SO SLOWLY THAT OTHER PEOPLE COULD HAVE NOTICED. OR THE OPPOSITE, BEING SO FIGETY OR RESTLESS THAT YOU HAVE BEEN MOVING AROUND A LOT MORE THAN USUAL: 0
4. FEELING TIRED OR HAVING LITTLE ENERGY: 0
3. TROUBLE FALLING OR STAYING ASLEEP: 0

## 2023-03-17 ASSESSMENT — ENCOUNTER SYMPTOMS
RESPIRATORY NEGATIVE: 1
GASTROINTESTINAL NEGATIVE: 1
COLOR CHANGE: 1

## 2023-03-17 NOTE — PROGRESS NOTES
3/17/2023    This is a 57 y.o. female   Chief Complaint   Patient presents with    Lesion(s)     Under left breast on bra line. States it was red and swollen, but now looks like it is healing   . [place] is seen today for new onset of skin lesion. It started 3 weeks ago. It started out as more of a pustule looking lesion. Then the center became eroded and more red. This lesion is located on her lower chest just at the bra line so she thought it was just irritation or an abscess caused by the friction. She has been using antibiotic ointment and covering it with a Band-Aid to reduce the friction. It is less irritated today. She is seeing dermatology and had a full skin check about 3 months ago and the lesion was not there        Patient Active Problem List   Diagnosis    Lateral meniscal tear    Menopausal syndrome    Elevated liver enzymes    Mild single current episode of major depressive disorder (HCC)    Mixed hyperlipidemia    Acquired hypothyroidism    Essential hypertension       Current Outpatient Medications   Medication Sig Dispense Refill    FLUoxetine (PROZAC) 40 MG capsule TAKE 2 CAPSULES EVERY  capsule 0    buPROPion (WELLBUTRIN XL) 300 MG extended release tablet TAKE 1 TABLET EVERY MORNING 90 tablet 0    rosuvastatin (CRESTOR) 5 MG tablet TAKE 1 TABLET EVERY DAY 90 tablet 0    traZODone (DESYREL) 50 MG tablet TAKE 1 AND 1/2 TABLETS EVERY NIGHT 135 tablet 0    levothyroxine (SYNTHROID) 137 MCG tablet TAKE 1 TABLET EVERY DAY 90 tablet 3    estradiol (ESTRACE) 0.1 MG/GM vaginal cream PLACE 0.5GM VAGINALLY TWICE WEEKLY      vitamin D (CHOLECALCIFEROL) 1000 UNIT TABS tablet Take 1,000 Units by mouth daily      aspirin 81 MG EC tablet Take 81 mg by mouth daily. No current facility-administered medications for this visit.        Allergies   Allergen Reactions    Lipitor [Atorvastatin]     Sulfa Antibiotics        /74 (Site: Left Upper Arm, Position: Sitting)   Pulse 59   Resp 18   Wt 147 lb (66.7 kg)   SpO2 95%   BMI 26.54 kg/m²     Social History     Tobacco Use    Smoking status: Never    Smokeless tobacco: Never   Substance Use Topics    Alcohol use: Yes       Review of Systems   Constitutional: Negative. Respiratory: Negative. Cardiovascular: Negative. Gastrointestinal: Negative. Skin:  Positive for color change and wound. Psychiatric/Behavioral: Negative. Physical Exam  Constitutional:       Appearance: Normal appearance. HENT:      Head: Normocephalic and atraumatic. Right Ear: External ear normal.      Left Ear: External ear normal.      Nose: Nose normal. No rhinorrhea. Mouth/Throat:      Pharynx: Oropharynx is clear. Eyes:      General:         Right eye: No discharge. Left eye: No discharge. Conjunctiva/sclera: Conjunctivae normal.   Neck:      Trachea: Phonation normal.   Cardiovascular:      Rate and Rhythm: Normal rate. Pulmonary:      Effort: Pulmonary effort is normal. No respiratory distress. Musculoskeletal:      Cervical back: Normal range of motion. Skin:     Coloration: Skin is not jaundiced or pale. Findings: Lesion (approximately 1 cm lesions with central erosion and raised smoth borders) present. Neurological:      General: No focal deficit present. Mental Status: She is alert and oriented to person, place, and time. Psychiatric:         Mood and Affect: Mood normal.         Behavior: Behavior normal.         Thought Content: Thought content normal.         Judgment: Judgment normal.       Diagnosis       ICD-10-CM    1. Atypical pigmented skin lesion  L81.9            Plan    Concern for squamous cell carcinoma- pt will call her dermatologist today for appointment  Continue to cover to reduce irritation  Report s/s infection, none currently  Image in media    No orders of the defined types were placed in this encounter. No orders of the defined types were placed in this encounter.       Patient Education:  plan    Return if symptoms worsen or fail to improve.

## 2023-03-28 ENCOUNTER — APPOINTMENT (RX ONLY)
Dept: URBAN - METROPOLITAN AREA CLINIC 170 | Facility: CLINIC | Age: 63
Setting detail: DERMATOLOGY
End: 2023-03-28

## 2023-03-28 DIAGNOSIS — D485 NEOPLASM OF UNCERTAIN BEHAVIOR OF SKIN: ICD-10-CM

## 2023-03-28 PROBLEM — D48.5 NEOPLASM OF UNCERTAIN BEHAVIOR OF SKIN: Status: ACTIVE | Noted: 2023-03-28

## 2023-03-28 PROCEDURE — 11102 TANGNTL BX SKIN SINGLE LES: CPT

## 2023-03-28 PROCEDURE — ? ADDITIONAL NOTES

## 2023-03-28 PROCEDURE — ? BIOPSY BY SHAVE METHOD

## 2023-03-28 ASSESSMENT — LOCATION SIMPLE DESCRIPTION DERM: LOCATION SIMPLE: ABDOMEN

## 2023-03-28 ASSESSMENT — LOCATION ZONE DERM: LOCATION ZONE: TRUNK

## 2023-03-28 ASSESSMENT — LOCATION DETAILED DESCRIPTION DERM: LOCATION DETAILED: SUBXIPHOID

## 2023-03-28 NOTE — PROCEDURE: BIOPSY BY SHAVE METHOD

## 2023-05-16 ENCOUNTER — APPOINTMENT (RX ONLY)
Dept: URBAN - METROPOLITAN AREA CLINIC 170 | Facility: CLINIC | Age: 63
Setting detail: DERMATOLOGY
End: 2023-05-16

## 2023-05-16 PROBLEM — C44.529 SQUAMOUS CELL CARCINOMA OF SKIN OF OTHER PART OF TRUNK: Status: ACTIVE | Noted: 2023-05-16

## 2023-05-16 PROCEDURE — 11602 EXC TR-EXT MAL+MARG 1.1-2 CM: CPT

## 2023-05-16 PROCEDURE — 12032 INTMD RPR S/A/T/EXT 2.6-7.5: CPT

## 2023-05-16 PROCEDURE — ? EXCISION

## 2023-05-16 PROCEDURE — ? COUNSELING

## 2023-05-16 NOTE — PROCEDURE: EXCISION
Dr Bravo Double Island Pedicle Flap Text: The defect edges were debeveled with a #15 scalpel blade.  Given the location of the defect, shape of the defect and the proximity to free margins a double island pedicle advancement flap was deemed most appropriate.  Using a sterile surgical marker, an appropriate advancement flap was drawn incorporating the defect, outlining the appropriate donor tissue and placing the expected incisions within the relaxed skin tension lines where possible.    The area thus outlined was incised deep to adipose tissue with a #15 scalpel blade.  The skin margins were undermined to an appropriate distance in all directions around the primary defect and laterally outward around the island pedicle utilizing iris scissors.  There was minimal undermining beneath the pedicle flap.

## 2023-05-16 NOTE — PROCEDURE: EXCISION
I would recommend patient to schedule an appointment for further evaluation of brain fog, it can be unrelated to hypothyroidism. Her TSH level again is well controlled, medication does not need to be adjusted. Adjusting medication increasing the dose will on the contrary trigger hyperthyroidism.   Bi-Rhombic Flap Text: The defect edges were debeveled with a #15 scalpel blade.  Given the location of the defect and the proximity to free margins a bi-rhombic flap was deemed most appropriate.  Using a sterile surgical marker, an appropriate rhombic flap was drawn incorporating the defect. The area thus outlined was incised deep to adipose tissue with a #15 scalpel blade.  The skin margins were undermined to an appropriate distance in all directions utilizing iris scissors.

## 2023-05-16 NOTE — PROCEDURE: EXCISION
----- Message from Ana Trejo MD sent at 8/28/2020 12:44 PM CDT -----  Naty labs are slightly better for her Thyroid  I still want to lower her thyroid dose to 88mcg po daily  Repeat TSH and free t4 in 8 wk to 12 wks  She has Tpo antibody positive reflective of her family history of thyroid disease and thyroditis this is something we will just monitor not to worry     Scalpel Size: 15 blade

## 2023-05-16 NOTE — PROCEDURE: EXCISION
responsive to verbal/Patient baseline mental status/Drowsy Ftsg Text: The defect edges were debeveled with a #15 scalpel blade.  Given the location of the defect, shape of the defect and the proximity to free margins a full thickness skin graft was deemed most appropriate.  Using a sterile surgical marker, the primary defect shape was transferred to the donor site. The area thus outlined was incised deep to adipose tissue with a #15 scalpel blade.  The harvested graft was then trimmed of adipose tissue until only dermis and epidermis was left.  The skin margins of the secondary defect were undermined to an appropriate distance in all directions utilizing iris scissors.  The secondary defect was closed with interrupted buried subcutaneous sutures.  The skin edges were then re-apposed with running  sutures.  The skin graft was then placed in the primary defect and oriented appropriately.

## 2023-05-16 NOTE — PROCEDURE: EXCISION
This is OK, patient does not need 1 additional pill  A-T Advancement Flap Text: The defect edges were debeveled with a #15 scalpel blade.  Given the location of the defect, shape of the defect and the proximity to free margins an A-T advancement flap was deemed most appropriate.  Using a sterile surgical marker, an appropriate advancement flap was drawn incorporating the defect and placing the expected incisions within the relaxed skin tension lines where possible.    The area thus outlined was incised deep to adipose tissue with a #15 scalpel blade.  The skin margins were undermined to an appropriate distance in all directions utilizing iris scissors.

## 2023-05-30 ENCOUNTER — APPOINTMENT (RX ONLY)
Dept: URBAN - METROPOLITAN AREA CLINIC 170 | Facility: CLINIC | Age: 63
Setting detail: DERMATOLOGY
End: 2023-05-30

## 2023-05-30 DIAGNOSIS — Z48.02 ENCOUNTER FOR REMOVAL OF SUTURES: ICD-10-CM

## 2023-05-30 PROCEDURE — 99024 POSTOP FOLLOW-UP VISIT: CPT

## 2023-05-30 PROCEDURE — ? SUTURE REMOVAL (GLOBAL PERIOD)

## 2023-05-30 ASSESSMENT — LOCATION DETAILED DESCRIPTION DERM: LOCATION DETAILED: SUBXIPHOID

## 2023-05-30 ASSESSMENT — LOCATION ZONE DERM: LOCATION ZONE: TRUNK

## 2023-05-30 ASSESSMENT — LOCATION SIMPLE DESCRIPTION DERM: LOCATION SIMPLE: ABDOMEN

## 2023-05-30 NOTE — PROCEDURE: SUTURE REMOVAL (GLOBAL PERIOD)
Detail Level: Detailed
Add 43870 Cpt? (Important Note: In 2017 The Use Of 20997 Is Being Tracked By Cms To Determine Future Global Period Reimbursement For Global Periods): yes

## 2023-06-30 ENCOUNTER — TELEPHONE (OUTPATIENT)
Dept: FAMILY MEDICINE CLINIC | Age: 63
End: 2023-06-30

## 2023-06-30 DIAGNOSIS — M54.50 LOW BACK PAIN, UNSPECIFIED BACK PAIN LATERALITY, UNSPECIFIED CHRONICITY, UNSPECIFIED WHETHER SCIATICA PRESENT: Primary | ICD-10-CM

## 2023-07-27 ENCOUNTER — HOSPITAL ENCOUNTER (OUTPATIENT)
Dept: MAMMOGRAPHY | Age: 63
Discharge: HOME | End: 2023-07-27
Payer: COMMERCIAL

## 2023-07-27 VITALS — WEIGHT: 150 LBS | HEIGHT: 62 IN | BODY MASS INDEX: 27.6 KG/M2

## 2023-07-27 DIAGNOSIS — Z12.31 BREAST CANCER SCREENING BY MAMMOGRAM: ICD-10-CM

## 2023-07-27 PROCEDURE — 77063 BREAST TOMOSYNTHESIS BI: CPT

## 2023-08-14 DIAGNOSIS — F32.A DEPRESSION, UNSPECIFIED DEPRESSION TYPE: ICD-10-CM

## 2023-08-14 DIAGNOSIS — E78.2 MIXED HYPERLIPIDEMIA: ICD-10-CM

## 2023-08-15 RX ORDER — ROSUVASTATIN CALCIUM 5 MG/1
TABLET, COATED ORAL
Qty: 90 TABLET | Refills: 3 | Status: SHIPPED | OUTPATIENT
Start: 2023-08-15

## 2023-08-15 RX ORDER — TRAZODONE HYDROCHLORIDE 50 MG/1
TABLET ORAL
Qty: 135 TABLET | Refills: 3 | Status: SHIPPED | OUTPATIENT
Start: 2023-08-15

## 2023-08-15 RX ORDER — FLUOXETINE HYDROCHLORIDE 40 MG/1
CAPSULE ORAL
Qty: 180 CAPSULE | Refills: 3 | Status: SHIPPED | OUTPATIENT
Start: 2023-08-15

## 2023-08-15 RX ORDER — BUPROPION HYDROCHLORIDE 300 MG/1
TABLET ORAL
Qty: 90 TABLET | Refills: 3 | Status: SHIPPED | OUTPATIENT
Start: 2023-08-15

## 2023-09-13 NOTE — PROGRESS NOTES
09/15/23      [facility]  Chief Complaint   Patient presents with    Medication Check     Cholesterol Thyroid depression          Diagnosis Orders   1. Mixed hyperlipidemia  CBC    Comprehensive Metabolic Panel    Lipid Panel      2. Acquired hypothyroidism  TSH with Reflex to FT4      3. Mild single current episode of major depressive disorder [facility])  External Referral To Social Work        [facility] Needs was seen today for medication check. Diagnoses and all orders for this visit:    Mixed hyperlipidemia  -     CBC; Future  -     Comprehensive Metabolic Panel; Future  -     Lipid Panel;  Future    Acquired hypothyroidism  -     TSH with Reflex to FT4; Future    Mild single current episode of major depressive disorder ([address])  -     External Referral To Social Work        HPI  She is taking her medication as prescribed and is seeing psychiatrist she sees the psychiatrist about once a year but would like a counselor for therapy    Past Medical History:   Diagnosis Date    Depression     Essential hypertension 1/25/2017    Hyperlipidemia     Hyperthyroidism      Social History     Socioeconomic History    Marital status:      Spouse name: Not on file    Number of children: Not on file    Years of education: Not on file    Highest education level: Not on file   Occupational History    Not on file   Tobacco Use    Smoking status: Never    Smokeless tobacco: Never   Vaping Use    Vaping Use: Never used   Substance and Sexual Activity    Alcohol use: Yes    Drug use: Never    Sexual activity: Yes   Other Topics Concern    Not on file   Social History Narrative    Not on file     Social Determinants of Health     Financial Resource Strain: Low Risk  (3/17/2023)    Overall Financial Resource Strain (CARDIA)     Difficulty of Paying Living Expenses: Not hard at all   Food Insecurity: No Food Insecurity (3/17/2023)    Hunger Vital Sign     Worried About Running Out of Food in the Last Year: Never true     Ran Out of Food

## 2023-09-14 PROBLEM — M70.61 TROCHANTERIC BURSITIS OF BOTH HIPS: Status: ACTIVE | Noted: 2018-08-13

## 2023-09-14 PROBLEM — M70.62 TROCHANTERIC BURSITIS OF BOTH HIPS: Status: ACTIVE | Noted: 2018-08-13

## 2023-09-14 PROBLEM — M54.9 BACK PAIN: Status: ACTIVE | Noted: 2018-03-01

## 2023-09-15 ENCOUNTER — OFFICE VISIT (OUTPATIENT)
Dept: FAMILY MEDICINE CLINIC | Age: 63
End: 2023-09-15

## 2023-09-15 VITALS
HEART RATE: 62 BPM | OXYGEN SATURATION: 96 % | TEMPERATURE: 97 F | BODY MASS INDEX: 27.6 KG/M2 | HEIGHT: 62 IN | SYSTOLIC BLOOD PRESSURE: 108 MMHG | RESPIRATION RATE: 14 BRPM | WEIGHT: 150 LBS | DIASTOLIC BLOOD PRESSURE: 72 MMHG

## 2023-09-15 DIAGNOSIS — E78.2 MIXED HYPERLIPIDEMIA: Primary | ICD-10-CM

## 2023-09-15 DIAGNOSIS — E78.2 MIXED HYPERLIPIDEMIA: ICD-10-CM

## 2023-09-15 DIAGNOSIS — E03.9 ACQUIRED HYPOTHYROIDISM: ICD-10-CM

## 2023-09-15 DIAGNOSIS — F32.0 MILD SINGLE CURRENT EPISODE OF MAJOR DEPRESSIVE DISORDER (HCC): ICD-10-CM

## 2023-09-15 RX ORDER — LORAZEPAM 0.5 MG/1
0.5 TABLET ORAL EVERY 6 HOURS PRN
COMMUNITY

## 2023-09-15 ASSESSMENT — PATIENT HEALTH QUESTIONNAIRE - PHQ9
3. TROUBLE FALLING OR STAYING ASLEEP: 0
SUM OF ALL RESPONSES TO PHQ QUESTIONS 1-9: 1
5. POOR APPETITE OR OVEREATING: 0
7. TROUBLE CONCENTRATING ON THINGS, SUCH AS READING THE NEWSPAPER OR WATCHING TELEVISION: 0
2. FEELING DOWN, DEPRESSED OR HOPELESS: 1
8. MOVING OR SPEAKING SO SLOWLY THAT OTHER PEOPLE COULD HAVE NOTICED. OR THE OPPOSITE, BEING SO FIGETY OR RESTLESS THAT YOU HAVE BEEN MOVING AROUND A LOT MORE THAN USUAL: 0
1. LITTLE INTEREST OR PLEASURE IN DOING THINGS: 0
SUM OF ALL RESPONSES TO PHQ QUESTIONS 1-9: 1
4. FEELING TIRED OR HAVING LITTLE ENERGY: 0
9. THOUGHTS THAT YOU WOULD BE BETTER OFF DEAD, OR OF HURTING YOURSELF: 0
SUM OF ALL RESPONSES TO PHQ QUESTIONS 1-9: 1
SUM OF ALL RESPONSES TO PHQ QUESTIONS 1-9: 1
10. IF YOU CHECKED OFF ANY PROBLEMS, HOW DIFFICULT HAVE THESE PROBLEMS MADE IT FOR YOU TO DO YOUR WORK, TAKE CARE OF THINGS AT HOME, OR GET ALONG WITH OTHER PEOPLE: 1
SUM OF ALL RESPONSES TO PHQ9 QUESTIONS 1 & 2: 1
6. FEELING BAD ABOUT YOURSELF - OR THAT YOU ARE A FAILURE OR HAVE LET YOURSELF OR YOUR FAMILY DOWN: 0

## 2023-09-15 ASSESSMENT — ANXIETY QUESTIONNAIRES
6. BECOMING EASILY ANNOYED OR IRRITABLE: 1
2. NOT BEING ABLE TO STOP OR CONTROL WORRYING: 1
5. BEING SO RESTLESS THAT IT IS HARD TO SIT STILL: 0
7. FEELING AFRAID AS IF SOMETHING AWFUL MIGHT HAPPEN: 1
3. WORRYING TOO MUCH ABOUT DIFFERENT THINGS: 1
IF YOU CHECKED OFF ANY PROBLEMS ON THIS QUESTIONNAIRE, HOW DIFFICULT HAVE THESE PROBLEMS MADE IT FOR YOU TO DO YOUR WORK, TAKE CARE OF THINGS AT HOME, OR GET ALONG WITH OTHER PEOPLE: NOT DIFFICULT AT ALL
GAD7 TOTAL SCORE: 6
1. FEELING NERVOUS, ANXIOUS, OR ON EDGE: 1
4. TROUBLE RELAXING: 1

## 2023-09-16 LAB
ALBUMIN SERPL-MCNC: 4.6 G/DL (ref 3.4–5)
ALBUMIN/GLOB SERPL: 1.8 {RATIO} (ref 1.1–2.2)
ALP SERPL-CCNC: 52 U/L (ref 40–129)
ALT SERPL-CCNC: 31 U/L (ref 10–40)
ANION GAP SERPL CALCULATED.3IONS-SCNC: 12 MMOL/L (ref 3–16)
AST SERPL-CCNC: 27 U/L (ref 15–37)
BILIRUB SERPL-MCNC: 0.3 MG/DL (ref 0–1)
BUN SERPL-MCNC: 18 MG/DL (ref 7–20)
CALCIUM SERPL-MCNC: 9.8 MG/DL (ref 8.3–10.6)
CHLORIDE SERPL-SCNC: 105 MMOL/L (ref 99–110)
CHOLEST SERPL-MCNC: 173 MG/DL (ref 0–199)
CO2 SERPL-SCNC: 23 MMOL/L (ref 21–32)
CREAT SERPL-MCNC: 0.8 MG/DL (ref 0.6–1.2)
DEPRECATED RDW RBC AUTO: 12.4 % (ref 12.4–15.4)
GFR SERPLBLD CREATININE-BSD FMLA CKD-EPI: >60 ML/MIN/{1.73_M2}
GLUCOSE SERPL-MCNC: 70 MG/DL (ref 70–99)
HCT VFR BLD AUTO: 42.8 % (ref 36–48)
HDLC SERPL-MCNC: 71 MG/DL (ref 40–60)
HGB BLD-MCNC: 14.6 G/DL (ref 12–16)
LDLC SERPL CALC-MCNC: 90 MG/DL
MCH RBC QN AUTO: 31.6 PG (ref 26–34)
MCHC RBC AUTO-ENTMCNC: 34.1 G/DL (ref 31–36)
MCV RBC AUTO: 92.9 FL (ref 80–100)
PLATELET # BLD AUTO: 319 K/UL (ref 135–450)
PMV BLD AUTO: 9.3 FL (ref 5–10.5)
POTASSIUM SERPL-SCNC: 4.4 MMOL/L (ref 3.5–5.1)
PROT SERPL-MCNC: 7.2 G/DL (ref 6.4–8.2)
RBC # BLD AUTO: 4.6 M/UL (ref 4–5.2)
SODIUM SERPL-SCNC: 140 MMOL/L (ref 136–145)
T4 FREE SERPL-MCNC: 2.5 NG/DL (ref 0.9–1.8)
TRIGL SERPL-MCNC: 58 MG/DL (ref 0–150)
TSH SERPL DL<=0.005 MIU/L-ACNC: 0.05 UIU/ML (ref 0.27–4.2)
VLDLC SERPL CALC-MCNC: 12 MG/DL
WBC # BLD AUTO: 8.9 K/UL (ref 4–11)

## 2023-09-19 DIAGNOSIS — E03.9 ACQUIRED HYPOTHYROIDISM: ICD-10-CM

## 2023-09-19 RX ORDER — LEVOTHYROXINE SODIUM 0.12 MG/1
125 TABLET ORAL DAILY
Qty: 30 TABLET | Refills: 3 | Status: SHIPPED | OUTPATIENT
Start: 2023-09-19

## 2023-09-20 NOTE — PROCEDURE: EXCISION
Pt to er c/o intermittent right upper abd pain onset one month ago.   Complex Repair And Single Advancement Flap Text: The defect edges were debeveled with a #15 scalpel blade.  The primary defect was closed partially with a complex linear closure.  Given the location of the remaining defect, shape of the defect and the proximity to free margins a single advancement flap was deemed most appropriate for complete closure of the defect.  Using a sterile surgical marker, an appropriate advancement flap was drawn incorporating the defect and placing the expected incisions within the relaxed skin tension lines where possible.    The area thus outlined was incised deep to adipose tissue with a #15 scalpel blade.  The skin margins were undermined to an appropriate distance in all directions utilizing iris scissors.

## 2023-10-03 ENCOUNTER — OFFICE VISIT (OUTPATIENT)
Dept: PSYCHOLOGY | Age: 63
End: 2023-10-03

## 2023-10-03 DIAGNOSIS — F41.9 ANXIETY: Primary | ICD-10-CM

## 2023-10-03 NOTE — PROGRESS NOTES
smokeless tobacco.  ETOH:   reports current alcohol use. Family History:   Family History   Problem Relation Age of Onset    Diabetes Mother     High Cholesterol Mother     High Blood Pressure Mother     Arthritis Mother     Depression Mother     Heart Disease Mother     High Blood Pressure Father     Coronary Art Dis Father     Arthritis Father     Heart Disease Father     Coronary Art Dis Sister     Other Sister         mi x2    Heart Disease Sister     High Cholesterol Sister     Diabetes Maternal Grandmother     Coronary Art Dis Maternal Grandmother     Heart Disease Maternal Grandmother     Coronary Art Dis Paternal Grandfather     Heart Disease Paternal Grandfather          A:  Patient endorses stable mood. Denies SI/HI, with good insight and motivation. 9/15/2023     1:31 PM 3/17/2023     7:59 AM 10/28/2022    10:27 AM   PHQ Scores   PHQ2 Score 1 0 0   PHQ9 Score 1 0 0     Interpretation of Total Score Depression Severity: 1-4 = Minimal depression, 5-9 = Mild depression, 10-14 = Moderate depression, 15-19 = Moderately severe depression, 20-27 = Severe depression         9/15/2023     1:32 PM 10/28/2022    10:28 AM   DANYELL 7 SCORE   DANYELL-7 Total Score 6 0     Interpretation of DANYELL-7 score: 5-9 = mild anxiety, 10-14 = moderate anxiety, 15+ = severe anxiety. Recommend referral to behavioral health for scores 10 or greater. Diagnosis:    1.  Anxiety          Past Diagnosis:        Diagnosis Date    Depression     Essential hypertension 1/25/2017    Hyperlipidemia     Hyperthyroidism          Plan:  Pt interventions:  Emphasized self-care as important for managing overall health  Trained in improving communication skills  Established rapport  Conducted functional assessment  Supportive techniques  [place]-setting to identify pt's primary goals for [facility] visit / overall health  Collaborative treatment planning,Clarified role of [facility] in primary care,Recommended that pt establish with a mental health clinician

## 2023-10-06 DIAGNOSIS — R79.89 DECREASED THYROID STIMULATING HORMONE (TSH) LEVEL: ICD-10-CM

## 2023-10-06 DIAGNOSIS — R79.89 ELEVATED SERUM FREE T4 LEVEL: Primary | ICD-10-CM

## 2023-10-13 DIAGNOSIS — E03.9 ACQUIRED HYPOTHYROIDISM: ICD-10-CM

## 2023-10-13 RX ORDER — LEVOTHYROXINE SODIUM 0.12 MG/1
125 TABLET ORAL DAILY
Qty: 30 TABLET | Refills: 3 | Status: SHIPPED | OUTPATIENT
Start: 2023-10-13

## 2023-10-13 NOTE — TELEPHONE ENCOUNTER
Last ov 09/15/2023   Future Appointments   Date Time Provider [address]   10/17/2023  4:00 PM Clearance Colder [facility]   10/18/2023 11:00 AM Lucia Rodriguez, [address] [place] [address] requesting a 90 day supply

## 2023-10-17 NOTE — PROGRESS NOTES
Behavioral Health Consultation- Follow UP  Tobias Dance, LISW  10/18/2023   11:33 AM      Juan Manuel Townsend, was evaluated through a synchronous (real-time) audio-video encounter. The patient (or guardian if applicable) is aware that this is a billable service, which includes applicable co-pays. This Virtual Visit was conducted with patient's (and/or legal guardian's) consent. Verbal consent to proceed has been obtained within the past 12 months. Patient identification was verified, and a caregiver was present when appropriate. The patient was located in a state where the provider was licensed to provide care. Time spent with Patient: 30 minutes  This is patient's second  [facility] appointment. Reason for Consult:    Chief Complaint   Patient presents with    Anxiety     Referring Provider: DO Jerry Frazier,  [address] [place] [address]    Patient provided informed consent for the behavioral health program. Discussed with patient model of service to include the limits of confidentiality (i.e. abuse reporting, suicide intervention, etc.) and short-term intervention focused approach. Pt indicated understanding. Feedback given to PCP. Verified the following information:  Patient's identification: Yes  Patient location: [address] [place] [address]   Patient's call back number: [phone]   Patient's emergency contact's name and number, as well as permission to contact them if needed: Extended Emergency Contact Information  Primary Emergency Contact: [facility]  Address: [address] Naveen Raines [place] [address] [place] Phone: [phone]  Mobile Phone: [phone]  Relation: Spouse     Provider location: [address] [address]:  Last appointment 10/3/23    Pt's son is currently residing at [facility] for a month. Pt processed around challenges with the transition. Pt has engaged in 2 different social interactions since last [facility] visit. Pt enjoyed this.  Pt states that her

## 2023-10-18 ENCOUNTER — TELEMEDICINE (OUTPATIENT)
Dept: PSYCHOLOGY | Age: 63
End: 2023-10-18
Payer: MEDICARE

## 2023-10-18 DIAGNOSIS — F41.9 ANXIETY: Primary | ICD-10-CM

## 2023-10-18 PROCEDURE — 1123F ACP DISCUSS/DSCN MKR DOCD: CPT | Performed by: SOCIAL WORKER

## 2023-10-18 PROCEDURE — 1036F TOBACCO NON-USER: CPT | Performed by: SOCIAL WORKER

## 2023-10-18 PROCEDURE — 90832 PSYTX W PT 30 MINUTES: CPT | Performed by: SOCIAL WORKER

## 2023-10-18 NOTE — PATIENT INSTRUCTIONS
Talk with your  about ways he can support you with your son. Begin to journal to express emotions. Engage in social activities 2x/month.

## 2023-10-31 NOTE — PROGRESS NOTES
Behavioral Health Consultation- Follow UP  LU Galvez  11/8/2023   11:35 AM      Jocelyne Durham, was evaluated through a synchronous (real-time) audio-video encounter. The patient (or guardian if applicable) is aware that this is a billable service, which includes applicable co-pays. This Virtual Visit was conducted with patient's (and/or legal guardian's) consent. Verbal consent to proceed has been obtained within the past 12 months. Patient identification was verified, and a caregiver was present when appropriate. The patient was located in a state where the provider was licensed to provide care. Time spent with Patient: 30 minutes  This is patient's third  [facility] appointment. Reason for Consult:    Chief Complaint   Patient presents with    Anxiety     Referring Provider: DO Jerry Farrell,  [address] [place] [address]    Patient provided informed consent for the behavioral health program. Discussed with patient model of service to include the limits of confidentiality (i.e. abuse reporting, suicide intervention, etc.) and short-term intervention focused approach. Pt indicated understanding. Feedback given to PCP. Verified the following information:  Patient's identification: Yes  Patient location: [address] [place] [address]   Patient's call back number: [phone]   Patient's emergency contact's name and number, as well as permission to contact them if needed: Extended Emergency Contact Information  Primary Emergency Contact: [facility]  Address: [address] Neto Espinosa of [address] [place] Phone: [phone]  Mobile Phone: [phone]  Relation: Spouse     Provider location: [address] [address]:  Last appointment 10/18/23    Pt reports overall things are stable. Pt states that her son is scheduled to return from [address] [place] [address] next Thursday.  Pt reports that she is impressed by his response to the situation, but disappointed in the lack of

## 2023-11-08 ENCOUNTER — TELEMEDICINE (OUTPATIENT)
Dept: PSYCHOLOGY | Age: 63
End: 2023-11-08
Payer: MEDICARE

## 2023-11-08 DIAGNOSIS — F41.9 ANXIETY: Primary | ICD-10-CM

## 2023-11-08 PROCEDURE — 90832 PSYTX W PT 30 MINUTES: CPT | Performed by: SOCIAL WORKER

## 2023-11-08 PROCEDURE — 1036F TOBACCO NON-USER: CPT | Performed by: SOCIAL WORKER

## 2023-11-08 PROCEDURE — 1123F ACP DISCUSS/DSCN MKR DOCD: CPT | Performed by: SOCIAL WORKER

## 2023-11-08 NOTE — PATIENT INSTRUCTIONS
Consider planning activities to do with your son. Use open-ended questions and validation to encourage his decision making.

## 2023-11-09 ENCOUNTER — APPOINTMENT (RX ONLY)
Dept: URBAN - METROPOLITAN AREA CLINIC 170 | Facility: CLINIC | Age: 63
Setting detail: DERMATOLOGY
End: 2023-11-09

## 2023-11-09 DIAGNOSIS — Z85.828 PERSONAL HISTORY OF OTHER MALIGNANT NEOPLASM OF SKIN: ICD-10-CM

## 2023-11-09 DIAGNOSIS — L81.4 OTHER MELANIN HYPERPIGMENTATION: ICD-10-CM | Status: STABLE

## 2023-11-09 DIAGNOSIS — L82.1 OTHER SEBORRHEIC KERATOSIS: ICD-10-CM | Status: STABLE

## 2023-11-09 DIAGNOSIS — D18.0 HEMANGIOMA: ICD-10-CM | Status: STABLE

## 2023-11-09 DIAGNOSIS — D22 MELANOCYTIC NEVI: ICD-10-CM | Status: STABLE

## 2023-11-09 PROBLEM — D18.01 HEMANGIOMA OF SKIN AND SUBCUTANEOUS TISSUE: Status: ACTIVE | Noted: 2023-11-09

## 2023-11-09 PROBLEM — D22.5 MELANOCYTIC NEVI OF TRUNK: Status: ACTIVE | Noted: 2023-11-09

## 2023-11-09 PROCEDURE — ? FULL BODY SKIN EXAM

## 2023-11-09 PROCEDURE — ? TREATMENT REGIMEN

## 2023-11-09 PROCEDURE — 99213 OFFICE O/P EST LOW 20 MIN: CPT

## 2023-11-09 PROCEDURE — ? COUNSELING

## 2023-11-09 ASSESSMENT — LOCATION SIMPLE DESCRIPTION DERM
LOCATION SIMPLE: ABDOMEN
LOCATION SIMPLE: UPPER BACK
LOCATION SIMPLE: RIGHT UPPER BACK

## 2023-11-09 ASSESSMENT — LOCATION ZONE DERM: LOCATION ZONE: TRUNK

## 2023-11-09 ASSESSMENT — LOCATION DETAILED DESCRIPTION DERM
LOCATION DETAILED: RIGHT MID-UPPER BACK
LOCATION DETAILED: INFERIOR THORACIC SPINE
LOCATION DETAILED: RIGHT LATERAL ABDOMEN
LOCATION DETAILED: EPIGASTRIC SKIN

## 2023-11-09 NOTE — PROCEDURE: MIPS QUALITY
Quality 226: Preventive Care And Screening: Tobacco Use: Screening And Cessation Intervention: Patient screened for tobacco use and is an ex/non-smoker
Additional Notes: Primary Dr. Laina Francois
Quality 110: Preventive Care And Screening: Influenza Immunization: Influenza Immunization Administered during Influenza season
Quality 431: Preventive Care And Screening: Unhealthy Alcohol Use - Screening: Patient not identified as an unhealthy alcohol user when screened for unhealthy alcohol use using a systematic screening method
Quality 130: Documentation Of Current Medications In The Medical Record: Current Medications Documented
Detail Level: Detailed

## 2023-11-16 ENCOUNTER — HOSPITAL ENCOUNTER (OUTPATIENT)
Dept: GENERAL RADIOLOGY | Age: 63
Discharge: HOME | End: 2023-11-16
Payer: MEDICARE

## 2023-11-16 ENCOUNTER — OFFICE VISIT (OUTPATIENT)
Dept: ORTHOPEDIC SURGERY | Age: 63
End: 2023-11-16
Payer: MEDICARE

## 2023-11-16 VITALS — HEIGHT: 62 IN | BODY MASS INDEX: 27.6 KG/M2 | WEIGHT: 150 LBS

## 2023-11-16 DIAGNOSIS — M75.21 BICEPS TENDINITIS OF RIGHT UPPER EXTREMITY: ICD-10-CM

## 2023-11-16 DIAGNOSIS — M25.511 RIGHT SHOULDER PAIN, UNSPECIFIED CHRONICITY: ICD-10-CM

## 2023-11-16 DIAGNOSIS — M25.511 RIGHT SHOULDER PAIN, UNSPECIFIED CHRONICITY: Primary | ICD-10-CM

## 2023-11-16 PROCEDURE — G8484 FLU IMMUNIZE NO ADMIN: HCPCS | Performed by: PHYSICIAN ASSISTANT

## 2023-11-16 PROCEDURE — 3017F COLORECTAL CA SCREEN DOC REV: CPT | Performed by: PHYSICIAN ASSISTANT

## 2023-11-16 PROCEDURE — 99204 OFFICE O/P NEW MOD 45 MIN: CPT | Performed by: PHYSICIAN ASSISTANT

## 2023-11-16 PROCEDURE — G8427 DOCREV CUR MEDS BY ELIG CLIN: HCPCS | Performed by: PHYSICIAN ASSISTANT

## 2023-11-16 PROCEDURE — 73030 X-RAY EXAM OF SHOULDER: CPT

## 2023-11-16 PROCEDURE — 1090F PRES/ABSN URINE INCON ASSESS: CPT | Performed by: PHYSICIAN ASSISTANT

## 2023-11-16 PROCEDURE — 1123F ACP DISCUSS/DSCN MKR DOCD: CPT | Performed by: PHYSICIAN ASSISTANT

## 2023-11-16 PROCEDURE — G8399 PT W/DXA RESULTS DOCUMENT: HCPCS | Performed by: PHYSICIAN ASSISTANT

## 2023-11-16 PROCEDURE — 1036F TOBACCO NON-USER: CPT | Performed by: PHYSICIAN ASSISTANT

## 2023-11-16 PROCEDURE — G8419 CALC BMI OUT NRM PARAM NOF/U: HCPCS | Performed by: PHYSICIAN ASSISTANT

## 2023-11-16 NOTE — PROGRESS NOTES
Date:  2023    Name:  Axel Brooke  Address:  [address]    :  1960      Age:   70 y.o.    SSN:  [id number]      Medical Record Number:  [id number]    Reason for Visit:    Chief Complaint    Shoulder Pain (New patient right shoulder )      DOS:2023     HPI: Jennifer Carrasco is a 70 y.o. female here today for evaluation of right shoulder pain that has been ongoing for 6 month with no associated injuries. States that she started feeling pain when working out specifically lifting weights. This then started spilling over into her ADLs including vacuuming and lifting objects above head. Pain is anterior. Denies any sharp pain with clicking and catching though does endorse a clicking sensation. Bothers her when she is sleeping but does not wake her from her slumber. Pain Assessment  Location of Pain: Shoulder  Location Modifiers: Right  Quality of Pain: Aching  Duration of Pain: A few minutes  Frequency of Pain: Intermittent  Limiting Behavior: Yes  Relieving Factors: Rest  Work-Related Injury: No  Are there other pain locations you wish to document?: No  ROS: Review of systems reviewed from Patient History Form completed today and available in the patient's chart under the Media tab.        Past Medical History:   Diagnosis Date    Depression     Essential hypertension 2017    Hyperlipidemia     Hyperthyroidism         Past Surgical History:   Procedure Laterality Date     SECTION      x4    COLONOSCOPY      2010    HYSTERECTOMY (CERVIX STATUS UNKNOWN)         Family History   Problem Relation Age of Onset    Diabetes Mother     High Cholesterol Mother     High Blood Pressure Mother     Arthritis Mother     Depression Mother     Heart Disease Mother     High Blood Pressure Father     Coronary Art Dis Father     Arthritis Father     Heart Disease Father     Coronary Art Dis Sister     Other Sister         mi x2    Heart Disease Sister     High Cholesterol Sister

## 2023-11-16 NOTE — PROGRESS NOTES
Behavioral Health Consultation- Follow UP  LU Harvey  11/29/2023   11:27 AM      Nguyen Venu, was evaluated through a synchronous (real-time) audio-video encounter. The patient (or guardian if applicable) is aware that this is a billable service, which includes applicable co-pays. This Virtual Visit was conducted with patient's (and/or legal guardian's) consent. Verbal consent to proceed has been obtained within the past 12 months. Patient identification was verified, and a caregiver was present when appropriate. The patient was located in a state where the provider was licensed to provide care. Time spent with Patient: 30 minutes  This is patient's fourth  [facility] appointment. Reason for Consult:    Chief Complaint   Patient presents with    Anxiety     Referring Provider: DO Jerry Perera,  [address] [place] [address]    Patient provided informed consent for the behavioral health program. Discussed with patient model of service to include the limits of confidentiality (i.e. abuse reporting, suicide intervention, etc.) and short-term intervention focused approach. Pt indicated understanding. Feedback given to PCP. Verified the following information:  Patient's identification: Yes  Patient location: [address] [place] [address]   Patient's call back number: [phone]   Patient's emergency contact's name and number, as well as permission to contact them if needed: Extended Emergency Contact Information  Primary Emergency Contact: [facility]  Address: [address] Felicia Mustafa of [address] [place] Phone: [phone]  Mobile Phone: [phone]  Relation: Spouse     Provider location: [address] [address]:  Last appointment 11/8/23    Pt states that her son has been approved to travel to [place] with their family for [place]. Pt states that they are feeling very relieved.  Pt states that her son has been home for 2 weeks, and so far it has been going

## 2023-11-27 DIAGNOSIS — F32.A DEPRESSION, UNSPECIFIED DEPRESSION TYPE: Primary | ICD-10-CM

## 2023-11-27 DIAGNOSIS — E78.2 MIXED HYPERLIPIDEMIA: ICD-10-CM

## 2023-11-27 NOTE — TELEPHONE ENCOUNTER
Patient needs a refill on buPROPion (WELLBUTRIN XL) 300 MG extended release tablet  . They need a 90 day supply. Patient needs a refill on rosuvastatin (CRESTOR) 5 MG tablet . They need a 90 day supply. Patient needs a refill on traZODone (DESYREL) 50 MG tablet. They need a 90 day supply.        Mail order or local pharmacy: [address] [place] [address] [phone] - F [phone]  [address] [place] [address], [address] [place] [address] [address]  Phone: [phone]  Fax: [phone]     LOV: 9/15/2023  Future Appointments   Date Time Provider [address] [place] [address]   11/29/2023 11:00 AM Thierno Matias PSYCHOLOGY [facility]   12/14/2023  9:00 AM CHRISTY Tovar [facility] SURGERY PROGRESS NOTE      Admit Date: 2020      Subjective:     Patient states pain is unchanged. Denies nausea. No flatus. But, g-tube output down significantly. Gastrografin not given as ordered yesterday. Despite text a 17:17 asking to confrim administration, which was given and acknowledgement received. Story this moning is it was signed out to the next shift who 'did not feel comfortable giving it without talking to radiology.'  Apparently there was no answer in radiology starting from 19:00 last night. I was called for the first time at 08:46 this morning to ask if I still wanted it given and to explain the events of overnight. Apparently night shift was unable to call me overnight. Objective:     Visit Vitals  BP (!) 183/94 (BP 1 Location: Right arm, BP Patient Position: At rest)   Pulse 72   Temp 97.5 °F (36.4 °C)   Resp 16   Ht 5' 4\" (1.626 m)   Wt 63.1 kg (139 lb 1.8 oz)   SpO2 98%   BMI 23.88 kg/m²        Temp (24hrs), Av.1 °F (36.7 °C), Min:97.5 °F (36.4 °C), Max:98.5 °F (36.9 °C)      No intake/output data recorded.    1901 -  0700  In: -   Out: 575 [Urine:450]    Physical Exam:    General:  alert, cooperative, no distress, appears stated age   Abdomen: soft, bowel sounds active, non-tender           Lab Results   Component Value Date/Time    WBC 9.5 2020 05:50 AM    HGB 12.3 2020 05:50 AM    HCT 37.8 2020 05:50 AM    PLATELET 809  05:50 AM    MCV 71.5 (L) 2020 05:50 AM     Lab Results   Component Value Date/Time    GFR est non-AA >60 2020 05:50 AM    GFR est AA >60 2020 05:50 AM    Creatinine 0.46 (L) 2020 05:50 AM    BUN 26 (H) 2020 05:50 AM    Sodium 135 (L) 2020 05:50 AM    Potassium 3.8 2020 05:50 AM    Chloride 101 2020 05:50 AM    CO2 33 (H) 2020 05:50 AM    Magnesium 2.1 2020 05:50 AM    Phosphorus 2.5 (L) 2020 05:50 AM       Assessment:     Active Problems: Polymyositis ([facility] [address] (7/28/2016)      HTN (hypertension) (7/28/2016)      Obstructive sleep apnea syndrome (1/17/2017)      SBO (small bowel obstruction) ([facility] [address] (8/2/2020)    Patient is stable with some signs of improvement but not complete resolution of SBO. Would like to avoid surgery in this complex patient if possible. To avoid any further confusion about contrast orders, will switch to a small bowel follow through administered in fluoro. Hopefully this will be both diagnostic and therapeutic. Plan:       1. Small bowel series in radiology   2.   Renew PPN

## 2023-11-27 NOTE — TELEPHONE ENCOUNTER
Spoke with pt she changed insurance and [facility]   Meds pended with correct pharmacy.

## 2023-11-28 RX ORDER — TRAZODONE HYDROCHLORIDE 50 MG/1
TABLET ORAL
Qty: 135 TABLET | Refills: 3 | Status: SHIPPED | OUTPATIENT
Start: 2023-11-28

## 2023-11-28 RX ORDER — BUPROPION HYDROCHLORIDE 300 MG/1
300 TABLET ORAL EVERY MORNING
Qty: 90 TABLET | Refills: 3 | Status: SHIPPED | OUTPATIENT
Start: 2023-11-28

## 2023-11-28 RX ORDER — ROSUVASTATIN CALCIUM 5 MG/1
5 TABLET, COATED ORAL DAILY
Qty: 90 TABLET | Refills: 3 | Status: SHIPPED | OUTPATIENT
Start: 2023-11-28

## 2023-11-29 ENCOUNTER — TELEMEDICINE (OUTPATIENT)
Dept: PSYCHOLOGY | Age: 63
End: 2023-11-29
Payer: MEDICARE

## 2023-11-29 DIAGNOSIS — F41.9 ANXIETY: Primary | ICD-10-CM

## 2023-11-29 PROCEDURE — 1036F TOBACCO NON-USER: CPT | Performed by: SOCIAL WORKER

## 2023-11-29 PROCEDURE — 1123F ACP DISCUSS/DSCN MKR DOCD: CPT | Performed by: SOCIAL WORKER

## 2023-11-29 PROCEDURE — 90832 PSYTX W PT 30 MINUTES: CPT | Performed by: SOCIAL WORKER

## 2024-02-21 DIAGNOSIS — R79.89 DECREASED THYROID STIMULATING HORMONE (TSH) LEVEL: ICD-10-CM

## 2024-02-21 DIAGNOSIS — R79.89 ELEVATED SERUM FREE T4 LEVEL: ICD-10-CM

## 2024-02-21 LAB
T4 FREE SERPL-MCNC: 1.6 NG/DL (ref 0.9–1.8)
TSH SERPL DL<=0.005 MIU/L-ACNC: 0.24 UIU/ML (ref 0.27–4.2)

## 2024-02-24 DIAGNOSIS — E03.9 ACQUIRED HYPOTHYROIDISM: ICD-10-CM

## 2024-02-27 RX ORDER — LEVOTHYROXINE SODIUM 0.12 MG/1
125 TABLET ORAL DAILY
Qty: 90 TABLET | Refills: 2 | Status: SHIPPED | OUTPATIENT
Start: 2024-02-27

## 2024-02-29 ENCOUNTER — TELEPHONE (OUTPATIENT)
Dept: FAMILY MEDICINE CLINIC | Age: 64
End: 2024-02-29

## 2024-02-29 DIAGNOSIS — F32.A DEPRESSION, UNSPECIFIED DEPRESSION TYPE: ICD-10-CM

## 2024-02-29 NOTE — TELEPHONE ENCOUNTER
Patient has a new mail order pharmacy; [facility].     She is requesting a 90 day supply of Prozac to be sent to them.     FLUoxetine (PROZAC) 40 MG capsule     [facility] - [address] [phone] - F [phone] [[id number]]

## 2024-03-01 RX ORDER — FLUOXETINE HYDROCHLORIDE 40 MG/1
CAPSULE ORAL
Qty: 180 CAPSULE | Refills: 3 | Status: SHIPPED | OUTPATIENT
Start: 2024-03-01

## 2024-04-16 NOTE — PROGRESS NOTES
euthymic  Affect Appropriate  Perception: within normal limits  Thought Content: within normal limits  Thought Process: logical, coherent and goal-directed  Associations    logical connections  Insight: good  Judgment    Intact  Appetite abnormal  Sleep disturbance No  Fatigue Yes  Loss of pleasure No  Impulsive behavior No  Morbid Ideation: no   Suicide Assessment: no suicidal ideation, plan, or intent  Homicidal Ideation: no    History:    Medications:   Current Outpatient Medications   Medication Sig Dispense Refill    FLUoxetine (PROZAC) 40 MG capsule Take two capsules every day 180 capsule 3    levothyroxine (SYNTHROID) 125 MCG tablet TAKE 1 TABLET BY MOUTH EVERY DAY 90 tablet 2    buPROPion (WELLBUTRIN XL) 300 MG extended release tablet Take 1 tablet by mouth every morning 90 tablet 3    traZODone (DESYREL) 50 MG tablet TAKE 1 AND 1/2 TABLETS EVERY NIGHT 135 tablet 3    rosuvastatin (CRESTOR) 5 MG tablet Take 1 tablet by mouth daily 90 tablet 3    LORazepam (ATIVAN) 0.5 MG tablet Take 1 tablet by mouth every 6 hours as needed for Anxiety. Max Daily Amount: 2 mg      estradiol (ESTRACE) 0.1 MG/GM vaginal cream PLACE 0.5GM VAGINALLY TWICE WEEKLY      vitamin D (CHOLECALCIFEROL) 1000 UNIT TABS tablet Take 1 tablet by mouth daily      aspirin 81 MG EC tablet Take 1 tablet by mouth daily       No current facility-administered medications for this visit.     Social History:   Social History     Socioeconomic History    Marital status:      Spouse name: Not on file    Number of children: Not on file    Years of education: Not on file    Highest education level: Not on file   Occupational History    Not on file   Tobacco Use    Smoking status: Never    Smokeless tobacco: Never   Vaping Use    Vaping Use: Never used   Substance and Sexual Activity    Alcohol use: Yes    Drug use: Never    Sexual activity: Yes   Other Topics Concern    Not on file   Social History Narrative    Not on file     Social Determinants

## 2024-04-23 ENCOUNTER — OFFICE VISIT (OUTPATIENT)
Dept: PSYCHOLOGY | Age: 64
End: 2024-04-23
Payer: MEDICARE

## 2024-04-23 DIAGNOSIS — F41.9 ANXIETY: Primary | ICD-10-CM

## 2024-04-23 PROCEDURE — 1123F ACP DISCUSS/DSCN MKR DOCD: CPT | Performed by: SOCIAL WORKER

## 2024-04-23 PROCEDURE — 1036F TOBACCO NON-USER: CPT | Performed by: SOCIAL WORKER

## 2024-04-23 PROCEDURE — 90832 PSYTX W PT 30 MINUTES: CPT | Performed by: SOCIAL WORKER

## 2024-04-23 ASSESSMENT — ANXIETY QUESTIONNAIRES
3. WORRYING TOO MUCH ABOUT DIFFERENT THINGS: SEVERAL DAYS
4. TROUBLE RELAXING: NOT AT ALL
7. FEELING AFRAID AS IF SOMETHING AWFUL MIGHT HAPPEN: NOT AT ALL
6. BECOMING EASILY ANNOYED OR IRRITABLE: NOT AT ALL
1. FEELING NERVOUS, ANXIOUS, OR ON EDGE: SEVERAL DAYS
GAD7 TOTAL SCORE: 3
2. NOT BEING ABLE TO STOP OR CONTROL WORRYING: SEVERAL DAYS
5. BEING SO RESTLESS THAT IT IS HARD TO SIT STILL: NOT AT ALL
IF YOU CHECKED OFF ANY PROBLEMS ON THIS QUESTIONNAIRE, HOW DIFFICULT HAVE THESE PROBLEMS MADE IT FOR YOU TO DO YOUR WORK, TAKE CARE OF THINGS AT HOME, OR GET ALONG WITH OTHER PEOPLE: NOT DIFFICULT AT ALL

## 2024-04-23 ASSESSMENT — PATIENT HEALTH QUESTIONNAIRE - PHQ9
SUM OF ALL RESPONSES TO PHQ QUESTIONS 1-9: 4
9. THOUGHTS THAT YOU WOULD BE BETTER OFF DEAD, OR OF HURTING YOURSELF: NOT AT ALL
4. FEELING TIRED OR HAVING LITTLE ENERGY: SEVERAL DAYS
8. MOVING OR SPEAKING SO SLOWLY THAT OTHER PEOPLE COULD HAVE NOTICED. OR THE OPPOSITE, BEING SO FIGETY OR RESTLESS THAT YOU HAVE BEEN MOVING AROUND A LOT MORE THAN USUAL: NOT AT ALL
SUM OF ALL RESPONSES TO PHQ QUESTIONS 1-9: 4
SUM OF ALL RESPONSES TO PHQ QUESTIONS 1-9: 4
6. FEELING BAD ABOUT YOURSELF - OR THAT YOU ARE A FAILURE OR HAVE LET YOURSELF OR YOUR FAMILY DOWN: SEVERAL DAYS
7. TROUBLE CONCENTRATING ON THINGS, SUCH AS READING THE NEWSPAPER OR WATCHING TELEVISION: NOT AT ALL
SUM OF ALL RESPONSES TO PHQ9 QUESTIONS 1 & 2: 1
SUM OF ALL RESPONSES TO PHQ QUESTIONS 1-9: 4
1. LITTLE INTEREST OR PLEASURE IN DOING THINGS: SEVERAL DAYS
2. FEELING DOWN, DEPRESSED OR HOPELESS: NOT AT ALL
10. IF YOU CHECKED OFF ANY PROBLEMS, HOW DIFFICULT HAVE THESE PROBLEMS MADE IT FOR YOU TO DO YOUR WORK, TAKE CARE OF THINGS AT HOME, OR GET ALONG WITH OTHER PEOPLE: NOT DIFFICULT AT ALL
3. TROUBLE FALLING OR STAYING ASLEEP: NOT AT ALL

## 2024-05-09 ENCOUNTER — APPOINTMENT (RX ONLY)
Dept: URBAN - METROPOLITAN AREA CLINIC 170 | Facility: CLINIC | Age: 64
Setting detail: DERMATOLOGY
End: 2024-05-09

## 2024-05-09 DIAGNOSIS — Z85.828 PERSONAL HISTORY OF OTHER MALIGNANT NEOPLASM OF SKIN: ICD-10-CM

## 2024-05-09 DIAGNOSIS — L82.1 OTHER SEBORRHEIC KERATOSIS: ICD-10-CM | Status: STABLE

## 2024-05-09 DIAGNOSIS — L81.4 OTHER MELANIN HYPERPIGMENTATION: ICD-10-CM | Status: STABLE

## 2024-05-09 DIAGNOSIS — D18.0 HEMANGIOMA: ICD-10-CM | Status: STABLE

## 2024-05-09 DIAGNOSIS — D22 MELANOCYTIC NEVI: ICD-10-CM | Status: STABLE

## 2024-05-09 PROBLEM — D22.5 MELANOCYTIC NEVI OF TRUNK: Status: ACTIVE | Noted: 2024-05-09

## 2024-05-09 PROBLEM — D18.01 HEMANGIOMA OF SKIN AND SUBCUTANEOUS TISSUE: Status: ACTIVE | Noted: 2024-05-09

## 2024-05-09 PROCEDURE — ? FULL BODY SKIN EXAM

## 2024-05-09 PROCEDURE — 99213 OFFICE O/P EST LOW 20 MIN: CPT

## 2024-05-09 PROCEDURE — ? COUNSELING

## 2024-05-09 PROCEDURE — ? TREATMENT REGIMEN

## 2024-05-09 ASSESSMENT — LOCATION ZONE DERM: LOCATION ZONE: TRUNK

## 2024-05-09 ASSESSMENT — LOCATION DETAILED DESCRIPTION DERM
LOCATION DETAILED: INFERIOR THORACIC SPINE
LOCATION DETAILED: EPIGASTRIC SKIN
LOCATION DETAILED: RIGHT MID-UPPER BACK
LOCATION DETAILED: RIGHT LATERAL ABDOMEN

## 2024-05-09 ASSESSMENT — LOCATION SIMPLE DESCRIPTION DERM
LOCATION SIMPLE: ABDOMEN
LOCATION SIMPLE: UPPER BACK
LOCATION SIMPLE: RIGHT UPPER BACK

## 2024-08-05 ENCOUNTER — HOSPITAL ENCOUNTER (OUTPATIENT)
Dept: MAMMOGRAPHY | Age: 64
Discharge: HOME | End: 2024-08-05
Payer: MEDICARE

## 2024-08-05 VITALS — HEIGHT: 62 IN | WEIGHT: 150 LBS | BODY MASS INDEX: 27.6 KG/M2

## 2024-08-05 DIAGNOSIS — Z12.31 ENCOUNTER FOR SCREENING MAMMOGRAM FOR BREAST CANCER: ICD-10-CM

## 2024-08-05 PROCEDURE — 77063 BREAST TOMOSYNTHESIS BI: CPT

## 2024-09-09 ENCOUNTER — OFFICE VISIT (OUTPATIENT)
Dept: FAMILY MEDICINE CLINIC | Age: 64
End: 2024-09-09
Payer: MEDICARE

## 2024-09-09 VITALS
DIASTOLIC BLOOD PRESSURE: 75 MMHG | OXYGEN SATURATION: 97 % | HEART RATE: 68 BPM | HEIGHT: 62 IN | WEIGHT: 153 LBS | SYSTOLIC BLOOD PRESSURE: 136 MMHG | BODY MASS INDEX: 28.16 KG/M2

## 2024-09-09 DIAGNOSIS — F32.A DEPRESSION, UNSPECIFIED DEPRESSION TYPE: ICD-10-CM

## 2024-09-09 DIAGNOSIS — E78.2 MIXED HYPERLIPIDEMIA: ICD-10-CM

## 2024-09-09 DIAGNOSIS — E03.9 ACQUIRED HYPOTHYROIDISM: ICD-10-CM

## 2024-09-09 DIAGNOSIS — Z76.89 ENCOUNTER TO ESTABLISH CARE: Primary | ICD-10-CM

## 2024-09-09 PROCEDURE — 3078F DIAST BP <80 MM HG: CPT | Performed by: SURGERY

## 2024-09-09 PROCEDURE — 1123F ACP DISCUSS/DSCN MKR DOCD: CPT | Performed by: SURGERY

## 2024-09-09 PROCEDURE — G2211 COMPLEX E/M VISIT ADD ON: HCPCS | Performed by: SURGERY

## 2024-09-09 PROCEDURE — 1090F PRES/ABSN URINE INCON ASSESS: CPT | Performed by: SURGERY

## 2024-09-09 PROCEDURE — 1036F TOBACCO NON-USER: CPT | Performed by: SURGERY

## 2024-09-09 PROCEDURE — 3075F SYST BP GE 130 - 139MM HG: CPT | Performed by: SURGERY

## 2024-09-09 PROCEDURE — G8419 CALC BMI OUT NRM PARAM NOF/U: HCPCS | Performed by: SURGERY

## 2024-09-09 PROCEDURE — 99214 OFFICE O/P EST MOD 30 MIN: CPT | Performed by: SURGERY

## 2024-09-09 PROCEDURE — G8399 PT W/DXA RESULTS DOCUMENT: HCPCS | Performed by: SURGERY

## 2024-09-09 PROCEDURE — G8427 DOCREV CUR MEDS BY ELIG CLIN: HCPCS | Performed by: SURGERY

## 2024-09-09 PROCEDURE — 3017F COLORECTAL CA SCREEN DOC REV: CPT | Performed by: SURGERY

## 2024-09-09 SDOH — ECONOMIC STABILITY: INCOME INSECURITY: HOW HARD IS IT FOR YOU TO PAY FOR THE VERY BASICS LIKE FOOD, HOUSING, MEDICAL CARE, AND HEATING?: NOT HARD AT ALL

## 2024-09-09 SDOH — ECONOMIC STABILITY: FOOD INSECURITY: WITHIN THE PAST 12 MONTHS, THE FOOD YOU BOUGHT JUST DIDN'T LAST AND YOU DIDN'T HAVE MONEY TO GET MORE.: NEVER TRUE

## 2024-09-09 SDOH — ECONOMIC STABILITY: FOOD INSECURITY: WITHIN THE PAST 12 MONTHS, YOU WORRIED THAT YOUR FOOD WOULD RUN OUT BEFORE YOU GOT MONEY TO BUY MORE.: NEVER TRUE

## 2024-09-09 ASSESSMENT — ENCOUNTER SYMPTOMS
SORE THROAT: 0
CONSTIPATION: 0
NAUSEA: 0
ABDOMINAL PAIN: 0
DIARRHEA: 0
SHORTNESS OF BREATH: 0
COUGH: 0

## 2024-09-13 DIAGNOSIS — E78.2 MIXED HYPERLIPIDEMIA: ICD-10-CM

## 2024-09-13 DIAGNOSIS — E03.9 ACQUIRED HYPOTHYROIDISM: ICD-10-CM

## 2024-09-13 DIAGNOSIS — F32.A DEPRESSION, UNSPECIFIED DEPRESSION TYPE: ICD-10-CM

## 2024-09-13 DIAGNOSIS — Z76.89 ENCOUNTER TO ESTABLISH CARE: ICD-10-CM

## 2024-09-13 LAB
ALBUMIN SERPL-MCNC: 4.3 G/DL (ref 3.4–5)
ALBUMIN/GLOB SERPL: 1.8 {RATIO} (ref 1.1–2.2)
ALP SERPL-CCNC: 54 U/L (ref 40–129)
ALT SERPL-CCNC: 31 U/L (ref 10–40)
ANION GAP SERPL CALCULATED.3IONS-SCNC: 11 MMOL/L (ref 3–16)
AST SERPL-CCNC: 27 U/L (ref 15–37)
BASOPHILS # BLD: 0.1 K/UL (ref 0–0.2)
BASOPHILS NFR BLD: 1 %
BILIRUB SERPL-MCNC: 0.3 MG/DL (ref 0–1)
BUN SERPL-MCNC: 15 MG/DL (ref 7–20)
CALCIUM SERPL-MCNC: 9.3 MG/DL (ref 8.3–10.6)
CHLORIDE SERPL-SCNC: 104 MMOL/L (ref 99–110)
CHOLEST SERPL-MCNC: 182 MG/DL (ref 0–199)
CO2 SERPL-SCNC: 22 MMOL/L (ref 21–32)
CREAT SERPL-MCNC: 0.9 MG/DL (ref 0.6–1.2)
DEPRECATED RDW RBC AUTO: 12.9 % (ref 12.4–15.4)
EOSINOPHIL # BLD: 0.2 K/UL (ref 0–0.6)
EOSINOPHIL NFR BLD: 3.6 %
GFR SERPLBLD CREATININE-BSD FMLA CKD-EPI: 70 ML/MIN/{1.73_M2}
GLUCOSE SERPL-MCNC: 90 MG/DL (ref 70–99)
HCT VFR BLD AUTO: 42.7 % (ref 36–48)
HDLC SERPL-MCNC: 82 MG/DL (ref 40–60)
HGB BLD-MCNC: 14.1 G/DL (ref 12–16)
LDL CHOLESTEROL: 86 MG/DL
LYMPHOCYTES # BLD: 1.3 K/UL (ref 1–5.1)
LYMPHOCYTES NFR BLD: 19.1 %
MCH RBC QN AUTO: 31.4 PG (ref 26–34)
MCHC RBC AUTO-ENTMCNC: 33 G/DL (ref 31–36)
MCV RBC AUTO: 95 FL (ref 80–100)
MONOCYTES # BLD: 0.7 K/UL (ref 0–1.3)
MONOCYTES NFR BLD: 10.7 %
NEUTROPHILS # BLD: 4.3 K/UL (ref 1.7–7.7)
NEUTROPHILS NFR BLD: 65.6 %
PLATELET # BLD AUTO: 283 K/UL (ref 135–450)
PMV BLD AUTO: 9.2 FL (ref 5–10.5)
POTASSIUM SERPL-SCNC: 4.4 MMOL/L (ref 3.5–5.1)
PROT SERPL-MCNC: 6.7 G/DL (ref 6.4–8.2)
RBC # BLD AUTO: 4.49 M/UL (ref 4–5.2)
SODIUM SERPL-SCNC: 137 MMOL/L (ref 136–145)
TRIGL SERPL-MCNC: 69 MG/DL (ref 0–150)
TSH SERPL DL<=0.005 MIU/L-ACNC: 0.47 UIU/ML (ref 0.27–4.2)
VLDLC SERPL CALC-MCNC: 14 MG/DL
WBC # BLD AUTO: 6.6 K/UL (ref 4–11)

## 2024-10-12 SDOH — HEALTH STABILITY: PHYSICAL HEALTH: ON AVERAGE, HOW MANY DAYS PER WEEK DO YOU ENGAGE IN MODERATE TO STRENUOUS EXERCISE (LIKE A BRISK WALK)?: 3 DAYS

## 2024-10-12 SDOH — HEALTH STABILITY: PHYSICAL HEALTH: ON AVERAGE, HOW MANY MINUTES DO YOU ENGAGE IN EXERCISE AT THIS LEVEL?: 50 MIN

## 2024-10-12 ASSESSMENT — PATIENT HEALTH QUESTIONNAIRE - PHQ9
SUM OF ALL RESPONSES TO PHQ QUESTIONS 1-9: 0
5. POOR APPETITE OR OVEREATING: NOT AT ALL
3. TROUBLE FALLING OR STAYING ASLEEP: NOT AT ALL
DEPRESSION UNABLE TO ASSESS: FUNCTIONAL CAPACITY MOTIVATION LIMITS ACCURACY
SUM OF ALL RESPONSES TO PHQ9 QUESTIONS 1 & 2: 0
SUM OF ALL RESPONSES TO PHQ QUESTIONS 1-9: 0
9. THOUGHTS THAT YOU WOULD BE BETTER OFF DEAD, OR OF HURTING YOURSELF: NOT AT ALL
SUM OF ALL RESPONSES TO PHQ QUESTIONS 1-9: 0
10. IF YOU CHECKED OFF ANY PROBLEMS, HOW DIFFICULT HAVE THESE PROBLEMS MADE IT FOR YOU TO DO YOUR WORK, TAKE CARE OF THINGS AT HOME, OR GET ALONG WITH OTHER PEOPLE: NOT DIFFICULT AT ALL
6. FEELING BAD ABOUT YOURSELF - OR THAT YOU ARE A FAILURE OR HAVE LET YOURSELF OR YOUR FAMILY DOWN: NOT AT ALL
SUM OF ALL RESPONSES TO PHQ QUESTIONS 1-9: 0
2. FEELING DOWN, DEPRESSED OR HOPELESS: NOT AT ALL
1. LITTLE INTEREST OR PLEASURE IN DOING THINGS: NOT AT ALL
7. TROUBLE CONCENTRATING ON THINGS, SUCH AS READING THE NEWSPAPER OR WATCHING TELEVISION: NOT AT ALL
4. FEELING TIRED OR HAVING LITTLE ENERGY: NOT AT ALL
8. MOVING OR SPEAKING SO SLOWLY THAT OTHER PEOPLE COULD HAVE NOTICED. OR THE OPPOSITE, BEING SO FIGETY OR RESTLESS THAT YOU HAVE BEEN MOVING AROUND A LOT MORE THAN USUAL: NOT AT ALL

## 2024-10-12 ASSESSMENT — LIFESTYLE VARIABLES
HOW MANY STANDARD DRINKS CONTAINING ALCOHOL DO YOU HAVE ON A TYPICAL DAY: 1 OR 2
HOW OFTEN DO YOU HAVE A DRINK CONTAINING ALCOHOL: 4
HOW OFTEN DO YOU HAVE SIX OR MORE DRINKS ON ONE OCCASION: 1
HOW MANY STANDARD DRINKS CONTAINING ALCOHOL DO YOU HAVE ON A TYPICAL DAY: 1
HOW OFTEN DO YOU HAVE A DRINK CONTAINING ALCOHOL: 2-3 TIMES A WEEK

## 2024-10-14 ENCOUNTER — OFFICE VISIT (OUTPATIENT)
Dept: FAMILY MEDICINE CLINIC | Age: 64
End: 2024-10-14

## 2024-10-14 VITALS
SYSTOLIC BLOOD PRESSURE: 115 MMHG | RESPIRATION RATE: 16 BRPM | TEMPERATURE: 97.8 F | DIASTOLIC BLOOD PRESSURE: 82 MMHG | HEART RATE: 65 BPM | OXYGEN SATURATION: 99 % | WEIGHT: 152 LBS | BODY MASS INDEX: 27.8 KG/M2

## 2024-10-14 DIAGNOSIS — Z23 NEED FOR INFLUENZA VACCINATION: ICD-10-CM

## 2024-10-14 DIAGNOSIS — I10 ESSENTIAL HYPERTENSION: ICD-10-CM

## 2024-10-14 DIAGNOSIS — E03.9 ACQUIRED HYPOTHYROIDISM: ICD-10-CM

## 2024-10-14 DIAGNOSIS — Z00.00 INITIAL MEDICARE ANNUAL WELLNESS VISIT: Primary | ICD-10-CM

## 2024-10-14 DIAGNOSIS — Z23 NEED FOR DIPHTHERIA-TETANUS-PERTUSSIS (TDAP) VACCINE: ICD-10-CM

## 2024-10-14 DIAGNOSIS — F32.A DEPRESSION, UNSPECIFIED DEPRESSION TYPE: ICD-10-CM

## 2024-10-14 DIAGNOSIS — E78.2 MIXED HYPERLIPIDEMIA: ICD-10-CM

## 2024-10-14 SDOH — ECONOMIC STABILITY: FOOD INSECURITY: WITHIN THE PAST 12 MONTHS, THE FOOD YOU BOUGHT JUST DIDN'T LAST AND YOU DIDN'T HAVE MONEY TO GET MORE.: NEVER TRUE

## 2024-10-14 SDOH — ECONOMIC STABILITY: INCOME INSECURITY: HOW HARD IS IT FOR YOU TO PAY FOR THE VERY BASICS LIKE FOOD, HOUSING, MEDICAL CARE, AND HEATING?: NOT HARD AT ALL

## 2024-10-14 SDOH — ECONOMIC STABILITY: FOOD INSECURITY: WITHIN THE PAST 12 MONTHS, YOU WORRIED THAT YOUR FOOD WOULD RUN OUT BEFORE YOU GOT MONEY TO BUY MORE.: NEVER TRUE

## 2024-10-14 ASSESSMENT — ANXIETY QUESTIONNAIRES
4. TROUBLE RELAXING: NOT AT ALL
GAD7 TOTAL SCORE: 0
6. BECOMING EASILY ANNOYED OR IRRITABLE: NOT AT ALL
5. BEING SO RESTLESS THAT IT IS HARD TO SIT STILL: NOT AT ALL
IF YOU CHECKED OFF ANY PROBLEMS ON THIS QUESTIONNAIRE, HOW DIFFICULT HAVE THESE PROBLEMS MADE IT FOR YOU TO DO YOUR WORK, TAKE CARE OF THINGS AT HOME, OR GET ALONG WITH OTHER PEOPLE: NOT DIFFICULT AT ALL
7. FEELING AFRAID AS IF SOMETHING AWFUL MIGHT HAPPEN: NOT AT ALL
3. WORRYING TOO MUCH ABOUT DIFFERENT THINGS: NOT AT ALL
2. NOT BEING ABLE TO STOP OR CONTROL WORRYING: NOT AT ALL
1. FEELING NERVOUS, ANXIOUS, OR ON EDGE: NOT AT ALL

## 2024-10-14 NOTE — PROGRESS NOTES
Medicare Annual Wellness Visit    Shandra Nava is here for Medicare AWV (Needing a T-Dap )    Assessment & Plan   Initial Medicare annual wellness visit  Acquired hypothyroidism - well controlled  Essential hypertension - well controlled with diet/exercise  Need for influenza vaccination  -     Influenza, FLUAD Trivalent, (age 65 y+), IM, Preservative Free, 0.5mL  Depression, unspecified depression type  Need for diphtheria-tetanus-pertussis (Tdap) vaccine  -     Tdap, BOOSTRIX, (age 10 yrs+), IM  Recommendations for Preventive Services Due: see orders and patient instructions/AVS.  Recommended screening schedule for the next 5-10 years is provided to the patient in written form: see Patient Instructions/AVS.     No follow-ups on file.     Subjective       Patient's complete Health Risk Assessment and screening values have been reviewed and are found in Flowsheets. The following problems were reviewed today and where indicated follow up appointments were made and/or referrals ordered.    Positive Risk Factor Screenings with Interventions:        Depression:  Depression Unable to Assess: Functional capacity motivation limits accuracy  PHQ-2 Score: 0  PHQ-9 Total Score: 0                      Advanced Directives:  Do you have a Living Will?: (!) No    Intervention:  has NO advanced directive - information provided                     Objective   Vitals:    10/14/24 1107   BP: 115/82   Site: Left Upper Arm   Position: Sitting   Pulse: 65   Resp: 16   Temp: 97.8 °F (36.6 °C)   TempSrc: Temporal   SpO2: 99%   Weight: 68.9 kg (152 lb)      Body mass index is 27.8 kg/m².                    Allergies   Allergen Reactions    Lipitor [Atorvastatin]     Sulfa Antibiotics      Prior to Visit Medications    Medication Sig Taking? Authorizing Provider   FLUoxetine (PROZAC) 40 MG capsule Take two capsules every day Yes Mandi Perea,    levothyroxine (SYNTHROID) 125 MCG tablet TAKE 1 TABLET BY MOUTH EVERY DAY Yes Mandi Perea,

## 2024-10-21 ENCOUNTER — LAB (OUTPATIENT)
Dept: FAMILY MEDICINE CLINIC | Age: 64
End: 2024-10-21
Payer: MEDICARE

## 2024-10-21 DIAGNOSIS — F32.A DEPRESSION, UNSPECIFIED DEPRESSION TYPE: ICD-10-CM

## 2024-10-21 DIAGNOSIS — E78.2 MIXED HYPERLIPIDEMIA: ICD-10-CM

## 2024-10-21 PROCEDURE — 90677 PCV20 VACCINE IM: CPT | Performed by: SURGERY

## 2024-10-21 PROCEDURE — G0009 ADMIN PNEUMOCOCCAL VACCINE: HCPCS | Performed by: SURGERY

## 2024-10-21 RX ORDER — ROSUVASTATIN CALCIUM 5 MG/1
5 TABLET, COATED ORAL DAILY
Qty: 90 TABLET | Refills: 3 | Status: SHIPPED | OUTPATIENT
Start: 2024-10-21

## 2024-10-21 RX ORDER — TRAZODONE HYDROCHLORIDE 50 MG/1
TABLET, FILM COATED ORAL
Qty: 135 TABLET | Refills: 3 | Status: SHIPPED | OUTPATIENT
Start: 2024-10-21

## 2024-10-21 RX ORDER — BUPROPION HYDROCHLORIDE 300 MG/1
300 TABLET ORAL EVERY MORNING
Qty: 90 TABLET | Refills: 3 | Status: SHIPPED | OUTPATIENT
Start: 2024-10-21

## 2024-10-21 NOTE — TELEPHONE ENCOUNTER
LAST REFILL 11/28/2023  AMOUNT 3 month supply     3 REFILLS  LAST VISIT 10/14/2024  NEXT VISIT   Future Appointments   Date Time Provider Department Center   4/14/2025 11:00 AM Rosalinda Lay DO [facility] ECC DEP

## 2024-11-13 DIAGNOSIS — E03.9 ACQUIRED HYPOTHYROIDISM: ICD-10-CM

## 2024-11-13 NOTE — TELEPHONE ENCOUNTER
LOV 10/14/2024  Future Appointments   Date Time Provider Department Center   4/14/2025 11:00 AM Rosalinda Lay DO [facility] E[facility] DEP

## 2024-11-14 RX ORDER — LEVOTHYROXINE SODIUM 125 UG/1
125 TABLET ORAL DAILY
Qty: 90 TABLET | Refills: 2 | Status: SHIPPED | OUTPATIENT
Start: 2024-11-14

## 2025-01-07 DIAGNOSIS — F32.A DEPRESSION, UNSPECIFIED DEPRESSION TYPE: ICD-10-CM

## 2025-01-07 NOTE — TELEPHONE ENCOUNTER
Future Appointments   Date Time Provider Department Center   4/14/2025 11:00 AM Rosalinda Lay DO [facility] ECC DEP     LOV 10/14/2024

## 2025-01-09 RX ORDER — FLUOXETINE 40 MG/1
CAPSULE ORAL
Qty: 180 CAPSULE | Refills: 3 | Status: SHIPPED | OUTPATIENT
Start: 2025-01-09

## 2025-02-12 ENCOUNTER — TELEPHONE (OUTPATIENT)
Dept: FAMILY MEDICINE CLINIC | Age: 65
End: 2025-02-12

## 2025-02-12 DIAGNOSIS — F41.9 ANXIETY: Primary | ICD-10-CM

## 2025-02-12 DIAGNOSIS — E03.9 ACQUIRED HYPOTHYROIDISM: ICD-10-CM

## 2025-02-12 DIAGNOSIS — F32.A DEPRESSION, UNSPECIFIED DEPRESSION TYPE: ICD-10-CM

## 2025-02-12 NOTE — TELEPHONE ENCOUNTER
LOV 10/14/2024    Future Appointments   Date Time Provider Department Center   4/14/2025 11:00 AM Rosalinda Lay DO [place] ECC DEP     Patient asking for Ativan but I don't see that you have ever perscribed it

## 2025-02-12 NOTE — TELEPHONE ENCOUNTER
levothyroxine (SYNTHROID) 125 MCG tablet [[id number]]       rosuvastatin (CRESTOR) 5 MG tablet [[id number]]     buPROPion (WELLBUTRIN XL) 300 MG extended release tablet [[id number]]     traZODone (DESYREL) 50 MG tablet [[id number]]     LORazepam (ATIVAN) 0.5 MG tablet [[id number]]     10/14/2024     Future Appointments   Date Time Provider Department Center   4/14/2025 11:00 AM [facility] DO [facility]    [facility] - [facility], [address] -  [phone] - F [phone] [16]     New pharmacy

## 2025-02-17 DIAGNOSIS — F32.A DEPRESSION, UNSPECIFIED DEPRESSION TYPE: ICD-10-CM

## 2025-02-17 DIAGNOSIS — E03.9 ACQUIRED HYPOTHYROIDISM: ICD-10-CM

## 2025-02-17 DIAGNOSIS — F41.9 ANXIETY: ICD-10-CM

## 2025-02-17 RX ORDER — TRAZODONE HYDROCHLORIDE 50 MG/1
TABLET ORAL
Qty: 135 TABLET | Refills: 3 | Status: SHIPPED | OUTPATIENT
Start: 2025-02-17

## 2025-02-17 RX ORDER — BUPROPION HYDROCHLORIDE 300 MG/1
300 TABLET ORAL EVERY MORNING
Qty: 90 TABLET | Refills: 3 | Status: SHIPPED | OUTPATIENT
Start: 2025-02-17

## 2025-02-17 RX ORDER — LEVOTHYROXINE SODIUM 125 UG/1
125 TABLET ORAL DAILY
Qty: 90 TABLET | Refills: 2 | Status: SHIPPED | OUTPATIENT
Start: 2025-02-17

## 2025-02-17 RX ORDER — LORAZEPAM 0.5 MG/1
0.5 TABLET ORAL EVERY 6 HOURS PRN
Qty: 120 TABLET | Refills: 0 | Status: SHIPPED | OUTPATIENT
Start: 2025-02-17 | End: 2025-05-18

## 2025-02-17 NOTE — TELEPHONE ENCOUNTER
Patients medication went to wrong pharmacy today. [facility]  [facility].    [facility]      [facility] - [address] - P [phone] - F [phone] [16]

## 2025-02-18 RX ORDER — LORAZEPAM 0.5 MG/1
0.5 TABLET ORAL EVERY 6 HOURS PRN
Qty: 120 TABLET | Refills: 0 | OUTPATIENT
Start: 2025-02-18 | End: 2025-05-19

## 2025-02-18 RX ORDER — TRAZODONE HYDROCHLORIDE 50 MG/1
TABLET ORAL
Qty: 135 TABLET | Refills: 3 | OUTPATIENT
Start: 2025-02-18

## 2025-02-18 RX ORDER — LEVOTHYROXINE SODIUM 125 UG/1
125 TABLET ORAL DAILY
Qty: 90 TABLET | Refills: 2 | OUTPATIENT
Start: 2025-02-18

## 2025-04-22 DIAGNOSIS — E78.2 MIXED HYPERLIPIDEMIA: ICD-10-CM

## 2025-04-22 RX ORDER — ROSUVASTATIN CALCIUM 5 MG/1
5 TABLET, COATED ORAL DAILY
Qty: 90 TABLET | Refills: 3 | Status: SHIPPED | OUTPATIENT
Start: 2025-04-22

## 2025-04-22 NOTE — TELEPHONE ENCOUNTER
Patient needs a refill   '  rosuvastatin (CRESTOR) 5 MG tablet     90 days  [facility]     Future Appointments   Date Time Provider Department Center   5/1/2025  8:40 AM Rosalinda Lay DO [facility] ECC DEP

## 2025-05-01 ENCOUNTER — OFFICE VISIT (OUTPATIENT)
Dept: FAMILY MEDICINE CLINIC | Age: 65
End: 2025-05-01
Payer: MEDICARE

## 2025-05-01 VITALS
WEIGHT: 150.4 LBS | BODY MASS INDEX: 27.51 KG/M2 | TEMPERATURE: 97.7 F | HEART RATE: 74 BPM | OXYGEN SATURATION: 97 % | SYSTOLIC BLOOD PRESSURE: 122 MMHG | DIASTOLIC BLOOD PRESSURE: 70 MMHG | RESPIRATION RATE: 15 BRPM

## 2025-05-01 DIAGNOSIS — E78.2 MIXED HYPERLIPIDEMIA: ICD-10-CM

## 2025-05-01 DIAGNOSIS — I10 ESSENTIAL HYPERTENSION: Primary | ICD-10-CM

## 2025-05-01 DIAGNOSIS — F32.A DEPRESSION, UNSPECIFIED DEPRESSION TYPE: ICD-10-CM

## 2025-05-01 DIAGNOSIS — E03.9 ACQUIRED HYPOTHYROIDISM: ICD-10-CM

## 2025-05-01 PROCEDURE — 3074F SYST BP LT 130 MM HG: CPT | Performed by: SURGERY

## 2025-05-01 PROCEDURE — 99214 OFFICE O/P EST MOD 30 MIN: CPT | Performed by: SURGERY

## 2025-05-01 PROCEDURE — 1159F MED LIST DOCD IN RCRD: CPT | Performed by: SURGERY

## 2025-05-01 PROCEDURE — 3078F DIAST BP <80 MM HG: CPT | Performed by: SURGERY

## 2025-05-01 PROCEDURE — 1123F ACP DISCUSS/DSCN MKR DOCD: CPT | Performed by: SURGERY

## 2025-05-01 PROCEDURE — 3017F COLORECTAL CA SCREEN DOC REV: CPT | Performed by: SURGERY

## 2025-05-01 PROCEDURE — 1036F TOBACCO NON-USER: CPT | Performed by: SURGERY

## 2025-05-01 PROCEDURE — G8419 CALC BMI OUT NRM PARAM NOF/U: HCPCS | Performed by: SURGERY

## 2025-05-01 PROCEDURE — 1090F PRES/ABSN URINE INCON ASSESS: CPT | Performed by: SURGERY

## 2025-05-01 PROCEDURE — G8399 PT W/DXA RESULTS DOCUMENT: HCPCS | Performed by: SURGERY

## 2025-05-01 PROCEDURE — G8427 DOCREV CUR MEDS BY ELIG CLIN: HCPCS | Performed by: SURGERY

## 2025-05-01 RX ORDER — LORAZEPAM 1 MG/1
TABLET ORAL
COMMUNITY
Start: 2025-03-26

## 2025-05-01 RX ORDER — FLUOXETINE HYDROCHLORIDE 40 MG/1
CAPSULE ORAL
Qty: 180 CAPSULE | Refills: 3 | Status: SHIPPED | OUTPATIENT
Start: 2025-05-01

## 2025-05-01 RX ORDER — TRAZODONE HYDROCHLORIDE 50 MG/1
TABLET ORAL
Qty: 135 TABLET | Refills: 3 | Status: SHIPPED | OUTPATIENT
Start: 2025-05-01

## 2025-05-01 RX ORDER — MULTIVITAMIN WITH IRON
1 TABLET ORAL DAILY
COMMUNITY

## 2025-05-01 RX ORDER — LEVOTHYROXINE SODIUM 125 UG/1
125 TABLET ORAL DAILY
Qty: 90 TABLET | Refills: 3 | Status: SHIPPED | OUTPATIENT
Start: 2025-05-01

## 2025-05-01 RX ORDER — BUPROPION HYDROCHLORIDE 300 MG/1
300 TABLET ORAL EVERY MORNING
Qty: 90 TABLET | Refills: 3 | Status: SHIPPED | OUTPATIENT
Start: 2025-05-01

## 2025-05-01 SDOH — ECONOMIC STABILITY: FOOD INSECURITY: WITHIN THE PAST 12 MONTHS, YOU WORRIED THAT YOUR FOOD WOULD RUN OUT BEFORE YOU GOT MONEY TO BUY MORE.: NEVER TRUE

## 2025-05-01 SDOH — ECONOMIC STABILITY: FOOD INSECURITY: WITHIN THE PAST 12 MONTHS, THE FOOD YOU BOUGHT JUST DIDN'T LAST AND YOU DIDN'T HAVE MONEY TO GET MORE.: NEVER TRUE

## 2025-05-01 ASSESSMENT — ANXIETY QUESTIONNAIRES
6. BECOMING EASILY ANNOYED OR IRRITABLE: NOT AT ALL
3. WORRYING TOO MUCH ABOUT DIFFERENT THINGS: NOT AT ALL
7. FEELING AFRAID AS IF SOMETHING AWFUL MIGHT HAPPEN: NOT AT ALL
GAD7 TOTAL SCORE: 0
1. FEELING NERVOUS, ANXIOUS, OR ON EDGE: NOT AT ALL
4. TROUBLE RELAXING: NOT AT ALL
IF YOU CHECKED OFF ANY PROBLEMS ON THIS QUESTIONNAIRE, HOW DIFFICULT HAVE THESE PROBLEMS MADE IT FOR YOU TO DO YOUR WORK, TAKE CARE OF THINGS AT HOME, OR GET ALONG WITH OTHER PEOPLE: NOT DIFFICULT AT ALL
2. NOT BEING ABLE TO STOP OR CONTROL WORRYING: NOT AT ALL
5. BEING SO RESTLESS THAT IT IS HARD TO SIT STILL: NOT AT ALL

## 2025-05-01 ASSESSMENT — PATIENT HEALTH QUESTIONNAIRE - PHQ9
SUM OF ALL RESPONSES TO PHQ QUESTIONS 1-9: 0
4. FEELING TIRED OR HAVING LITTLE ENERGY: NOT AT ALL
9. THOUGHTS THAT YOU WOULD BE BETTER OFF DEAD, OR OF HURTING YOURSELF: NOT AT ALL
7. TROUBLE CONCENTRATING ON THINGS, SUCH AS READING THE NEWSPAPER OR WATCHING TELEVISION: NOT AT ALL
10. IF YOU CHECKED OFF ANY PROBLEMS, HOW DIFFICULT HAVE THESE PROBLEMS MADE IT FOR YOU TO DO YOUR WORK, TAKE CARE OF THINGS AT HOME, OR GET ALONG WITH OTHER PEOPLE: NOT DIFFICULT AT ALL
6. FEELING BAD ABOUT YOURSELF - OR THAT YOU ARE A FAILURE OR HAVE LET YOURSELF OR YOUR FAMILY DOWN: NOT AT ALL
2. FEELING DOWN, DEPRESSED OR HOPELESS: NOT AT ALL
5. POOR APPETITE OR OVEREATING: NOT AT ALL
1. LITTLE INTEREST OR PLEASURE IN DOING THINGS: NOT AT ALL
SUM OF ALL RESPONSES TO PHQ QUESTIONS 1-9: 0
3. TROUBLE FALLING OR STAYING ASLEEP: NOT AT ALL
8. MOVING OR SPEAKING SO SLOWLY THAT OTHER PEOPLE COULD HAVE NOTICED. OR THE OPPOSITE, BEING SO FIGETY OR RESTLESS THAT YOU HAVE BEEN MOVING AROUND A LOT MORE THAN USUAL: NOT AT ALL

## 2025-05-01 NOTE — PROGRESS NOTES
5/1/2025    History of Present Illness  The patient presents for a medication refill and weight management.    No current health concerns or complaints are reported. Refills for existing medications are requested.    Interest in the use of Wegovy for weight loss is expressed, despite maintaining a stable weight. Membership at a gym and engagement in regular exercise are noted. A desire to lose approximately 10 pounds for better fitting clothes is mentioned.    This is a 64 y.o. female   Chief Complaint   Patient presents with    Medication Check   .    Labs good in September. Mammo due September.          Patient Active Problem List   Diagnosis    Lateral meniscal tear    Menopausal syndrome    Elevated liver enzymes    Mild single current episode of major depressive disorder (HCC)    Mixed hyperlipidemia    Acquired hypothyroidism    Essential hypertension    Trochanteric bursitis of both hips    Lumbar strain    Back pain       Current Outpatient Medications   Medication Sig Dispense Refill    LORazepam (ATIVAN) 1 MG tablet TAKE 1 TABLET BY MOUTH DAILY AT BEDTIME AS NEEDED FOR INSOMNIA      Multiple Vitamin (MULTIVITAMIN) TABS tablet Take 1 tablet by mouth daily      levothyroxine (SYNTHROID) 125 MCG tablet Take 1 tablet by mouth daily 90 tablet 3    buPROPion (WELLBUTRIN XL) 300 MG extended release tablet Take 1 tablet by mouth every morning 90 tablet 3    traZODone (DESYREL) 50 MG tablet TAKE 1 AND 1/2 TABLETS EVERY NIGHT 135 tablet 3    FLUoxetine (PROZAC) 40 MG capsule Take two capsules every day 180 capsule 3    rosuvastatin (CRESTOR) 5 MG tablet Take 1 tablet by mouth daily 90 tablet 3    LORazepam (ATIVAN) 0.5 MG tablet Take 1 tablet by mouth every 6 hours as needed for Anxiety for up to 90 days. Max Daily Amount: 2 mg 120 tablet 0    estradiol (ESTRACE) 0.1 MG/GM vaginal cream PLACE 0.5GM VAGINALLY TWICE WEEKLY      vitamin D (CHOLECALCIFEROL) 1000 UNIT TABS tablet Take 1 tablet by mouth daily      aspirin

## 2025-05-06 ENCOUNTER — TELEPHONE (OUTPATIENT)
Dept: FAMILY MEDICINE CLINIC | Age: 65
End: 2025-05-06

## 2025-05-06 NOTE — TELEPHONE ENCOUNTER
Pt called she got a text from her mail order pharmacy stating they need more information about one of the meds sent in on 5/1 and have not heard back from Dr Lay.     Pt would like for the office to reach out to her mail order pharmacy to find out what is going on.     Please advise.

## 2025-05-16 ENCOUNTER — APPOINTMENT (OUTPATIENT)
Dept: URBAN - METROPOLITAN AREA CLINIC 170 | Facility: CLINIC | Age: 65
Setting detail: DERMATOLOGY
End: 2025-05-16

## 2025-05-16 DIAGNOSIS — D22 MELANOCYTIC NEVI: ICD-10-CM | Status: STABLE

## 2025-05-16 DIAGNOSIS — Z85.828 PERSONAL HISTORY OF OTHER MALIGNANT NEOPLASM OF SKIN: ICD-10-CM

## 2025-05-16 DIAGNOSIS — L57.0 ACTINIC KERATOSIS: ICD-10-CM | Status: INADEQUATELY CONTROLLED

## 2025-05-16 DIAGNOSIS — L82.1 OTHER SEBORRHEIC KERATOSIS: ICD-10-CM | Status: STABLE

## 2025-05-16 DIAGNOSIS — D18.0 HEMANGIOMA: ICD-10-CM | Status: STABLE

## 2025-05-16 DIAGNOSIS — L81.4 OTHER MELANIN HYPERPIGMENTATION: ICD-10-CM | Status: STABLE

## 2025-05-16 PROBLEM — D18.01 HEMANGIOMA OF SKIN AND SUBCUTANEOUS TISSUE: Status: ACTIVE | Noted: 2025-05-16

## 2025-05-16 PROBLEM — D22.5 MELANOCYTIC NEVI OF TRUNK: Status: ACTIVE | Noted: 2025-05-16

## 2025-05-16 PROCEDURE — 99213 OFFICE O/P EST LOW 20 MIN: CPT | Mod: 25

## 2025-05-16 PROCEDURE — ? TREATMENT REGIMEN

## 2025-05-16 PROCEDURE — ? MEDICARE ABN

## 2025-05-16 PROCEDURE — ? FULL BODY SKIN EXAM

## 2025-05-16 PROCEDURE — ? COUNSELING

## 2025-05-16 PROCEDURE — ? LIQUID NITROGEN

## 2025-05-16 PROCEDURE — 17000 DESTRUCT PREMALG LESION: CPT

## 2025-05-16 PROCEDURE — 17003 DESTRUCT PREMALG LES 2-14: CPT

## 2025-05-16 ASSESSMENT — LOCATION SIMPLE DESCRIPTION DERM
LOCATION SIMPLE: UPPER BACK
LOCATION SIMPLE: NOSE
LOCATION SIMPLE: RIGHT UPPER BACK
LOCATION SIMPLE: ABDOMEN
LOCATION SIMPLE: RIGHT UPPER ARM

## 2025-05-16 ASSESSMENT — LOCATION DETAILED DESCRIPTION DERM
LOCATION DETAILED: RIGHT MID-UPPER BACK
LOCATION DETAILED: RIGHT ANTERIOR PROXIMAL UPPER ARM
LOCATION DETAILED: EPIGASTRIC SKIN
LOCATION DETAILED: INFERIOR THORACIC SPINE
LOCATION DETAILED: NASAL DORSUM
LOCATION DETAILED: RIGHT LATERAL ABDOMEN

## 2025-05-16 ASSESSMENT — LOCATION ZONE DERM
LOCATION ZONE: NOSE
LOCATION ZONE: TRUNK
LOCATION ZONE: ARM

## 2025-05-16 NOTE — HPI: EVALUATION OF SKIN LESION(S)
What Type Of Note Output Would You Prefer (Optional)?: Bullet Format
Hpi Title: Evaluation of Skin Lesions
Family Member: Dad
Additional History: Right arm irritated lesion wants removed

## 2025-05-16 NOTE — PROCEDURE: MEDICARE ABN
Reason?: [id number]
Detail Level: Detailed
Payment Option: Option 1: Bill Medicare, await for decision on payment.
Reason?: Additional Information
Procedure (Limit To 20 Characters): liquid nitrogen

## 2025-05-16 NOTE — PROCEDURE: LIQUID NITROGEN
Detail Level: Detailed
Number Of Freeze-Thaw Cycles: 1 freeze-thaw cycle
Render Post-Care Instructions In Note?: yes
Consent: The patient's consent was obtained including but not limited to risks of crusting, scabbing, blistering, scarring, darker or lighter pigmentary change, recurrence, incomplete removal and infection.
Render Note In Bullet Format When Appropriate: No
Post-Care Instructions: I reviewed with the patient in detail post-care instructions. Patient is to wear sunprotection, and avoid picking at any of the treated lesions. Pt may apply Vaseline to crusted or scabbing areas.
Duration Of Freeze Thaw-Cycle (Seconds): 5

## 2025-05-23 NOTE — TELEPHONE ENCOUNTER
Spoke to pharmacy they areb not sure what medication they had questions about. The only medication that was sent on 5/1/25 that questions had to be answered for was scanned into the the chart on 5/5/25 after being addressed and faxed. It was in regards to her Fluoxetine.

## 2025-08-11 ENCOUNTER — HOSPITAL ENCOUNTER (OUTPATIENT)
Dept: MAMMOGRAPHY | Age: 65
Discharge: HOME | End: 2025-08-11
Payer: MEDICARE

## 2025-08-11 VITALS — HEIGHT: 62 IN | WEIGHT: 150 LBS | BODY MASS INDEX: 27.6 KG/M2

## 2025-08-11 DIAGNOSIS — Z12.31 OTHER SCREENING MAMMOGRAM: ICD-10-CM

## 2025-08-11 PROCEDURE — 77063 BREAST TOMOSYNTHESIS BI: CPT

## 2025-08-14 ENCOUNTER — TELEPHONE (OUTPATIENT)
Dept: WOMENS IMAGING | Age: 65
End: 2025-08-14

## 2025-08-19 ENCOUNTER — RESULTS FOLLOW-UP (OUTPATIENT)
Dept: FAMILY MEDICINE CLINIC | Age: 65
End: 2025-08-19

## 2025-08-19 ENCOUNTER — HOSPITAL ENCOUNTER (OUTPATIENT)
Dept: WOMENS IMAGING | Age: 65
Discharge: HOME | End: 2025-08-19
Payer: MEDICARE

## 2025-08-19 DIAGNOSIS — R92.8 ABNORMAL SCREENING MAMMOGRAM: Primary | ICD-10-CM

## 2025-08-19 DIAGNOSIS — R92.8 ABNORMAL SCREENING MAMMOGRAM: ICD-10-CM

## 2025-08-19 PROCEDURE — G0279 TOMOSYNTHESIS, MAMMO: HCPCS
